# Patient Record
Sex: MALE | Race: WHITE | NOT HISPANIC OR LATINO | URBAN - METROPOLITAN AREA
[De-identification: names, ages, dates, MRNs, and addresses within clinical notes are randomized per-mention and may not be internally consistent; named-entity substitution may affect disease eponyms.]

---

## 2019-03-18 ENCOUNTER — EMERGENCY (EMERGENCY)
Facility: HOSPITAL | Age: 39
LOS: 1 days | Discharge: HOME | End: 2019-03-18
Attending: EMERGENCY MEDICINE | Admitting: EMERGENCY MEDICINE
Payer: SELF-PAY

## 2019-03-18 VITALS
WEIGHT: 240.97 LBS | HEART RATE: 99 BPM | SYSTOLIC BLOOD PRESSURE: 153 MMHG | DIASTOLIC BLOOD PRESSURE: 86 MMHG | OXYGEN SATURATION: 95 % | TEMPERATURE: 97 F | RESPIRATION RATE: 18 BRPM

## 2019-03-18 DIAGNOSIS — S61.210A LACERATION WITHOUT FOREIGN BODY OF RIGHT INDEX FINGER WITHOUT DAMAGE TO NAIL, INITIAL ENCOUNTER: ICD-10-CM

## 2019-03-18 PROCEDURE — 12041 INTMD RPR N-HF/GENIT 2.5CM/<: CPT

## 2019-03-18 PROCEDURE — 99283 EMERGENCY DEPT VISIT LOW MDM: CPT | Mod: 25

## 2019-03-18 RX ADMIN — Medication 1 TABLET(S): at 20:39

## 2019-03-18 NOTE — ED PROVIDER NOTE - CLINICAL SUMMARY MEDICAL DECISION MAKING FREE TEXT BOX
38 yo M pw right second finger lac- tdap utd (last one two yrs ago) will put on ppx abx as pt was cutting cold cuts

## 2019-03-18 NOTE — ED PROVIDER NOTE - NSFOLLOWUPINSTRUCTIONS_ED_ALL_ED_FT
Follow up with your PMD within 48-72 hours for wound check. Keep sutures covered and dry for 24 hours then clean with soap and water three times daily.  Apply bacitracin and cover. Follow up with hand Dr. Horta, call for an appointment: [phone].  Take Augmentin two times a day for seven days. Return to ED for suture removal 7-10 days. Any increased pain, redness, streaking (red lines), swelling, fever, chills return to ER.

## 2019-03-18 NOTE — ED ADULT NURSE NOTE - OBJECTIVE STATEMENT
Pt came in to ED c/o LAC on right index finger by a knife at work while washing the dishes. Able to move finger, +radial pulse, cap refill <3 sec, denies any other concerns at this time

## 2019-03-18 NOTE — ED PROVIDER NOTE - OBJECTIVE STATEMENT
39 year old male, no PMHx, presents to the ED complaining of right second finger laceration. Patient works at a [email or web address] and was washing dishes. He reached his hand into soapy water and didn't see there was a knife and accidentally cut his finger. He denies other injuries, numbness, tingling or other complaints.

## 2019-03-18 NOTE — ED PROVIDER NOTE - ATTENDING CONTRIBUTION TO CARE
pt cut R 2nd finger accidentally about an hr pta on knife.  denies numbnessweaknes. assoc c bleeding    exam: gen:nad R 2nd finger c approx 2cm linear lac on radial side of mid phalanx. no visible tendon/ligament lac. no active bleeding. nl distal sensation and nl cap refill. nl color.  5/5 strength flex/ext dip/pip/mcp.      AP: finger lac. nv intact. motor intact. lac repair. pt cut R 2nd finger accidentally about an hr pta on knife.  denies numbnessweaknes. assoc c bleeding    exam: gen:nad R 2nd finger c approx 2cm linear lac on radial side of mid phalanx. no visible tendon/ligament lac. no active bleeding. nl distal sensation and nl cap refill. nl color.  5/5 strength flex/ext dip/pip/mcp.      AP: finger lac. nv intact. motor intact. lac repair..

## 2019-07-12 ENCOUNTER — EMERGENCY (EMERGENCY)
Facility: HOSPITAL | Age: 39
LOS: 1 days | Discharge: HOME | End: 2019-07-12
Attending: EMERGENCY MEDICINE | Admitting: EMERGENCY MEDICINE
Payer: MEDICAID

## 2019-07-12 VITALS
HEART RATE: 105 BPM | OXYGEN SATURATION: 97 % | HEIGHT: 73 IN | DIASTOLIC BLOOD PRESSURE: 87 MMHG | WEIGHT: 235.01 LBS | TEMPERATURE: 99 F | SYSTOLIC BLOOD PRESSURE: 128 MMHG | RESPIRATION RATE: 17 BRPM

## 2019-07-12 VITALS
HEART RATE: 78 BPM | TEMPERATURE: 99 F | RESPIRATION RATE: 16 BRPM | OXYGEN SATURATION: 96 % | SYSTOLIC BLOOD PRESSURE: 119 MMHG | DIASTOLIC BLOOD PRESSURE: 72 MMHG

## 2019-07-12 LAB
ALBUMIN SERPL ELPH-MCNC: 2.3 G/DL — LOW (ref 3.3–5)
ALP SERPL-CCNC: 431 U/L — HIGH (ref 40–120)
ALT FLD-CCNC: 85 U/L DA — HIGH (ref 10–45)
ANION GAP SERPL CALC-SCNC: 11 MMOL/L — SIGNIFICANT CHANGE UP (ref 5–17)
AST SERPL-CCNC: 192 U/L — HIGH (ref 10–40)
BASOPHILS # BLD AUTO: 0.03 K/UL — SIGNIFICANT CHANGE UP (ref 0–0.2)
BASOPHILS NFR BLD AUTO: 0.4 % — SIGNIFICANT CHANGE UP (ref 0–2)
BILIRUB SERPL-MCNC: 5.2 MG/DL — HIGH (ref 0.2–1.2)
BUN SERPL-MCNC: 10 MG/DL — SIGNIFICANT CHANGE UP (ref 7–23)
CALCIUM SERPL-MCNC: 8.5 MG/DL — SIGNIFICANT CHANGE UP (ref 8.4–10.5)
CHLORIDE SERPL-SCNC: 102 MMOL/L — SIGNIFICANT CHANGE UP (ref 96–108)
CO2 SERPL-SCNC: 21 MMOL/L — LOW (ref 22–31)
CREAT SERPL-MCNC: 0.84 MG/DL — SIGNIFICANT CHANGE UP (ref 0.5–1.3)
EOSINOPHIL # BLD AUTO: 0.1 K/UL — SIGNIFICANT CHANGE UP (ref 0–0.5)
EOSINOPHIL NFR BLD AUTO: 1.4 % — SIGNIFICANT CHANGE UP (ref 0–6)
GLUCOSE SERPL-MCNC: 114 MG/DL — HIGH (ref 70–99)
HCT VFR BLD CALC: 29 % — LOW (ref 39–50)
HGB BLD-MCNC: 9.9 G/DL — LOW (ref 13–17)
IMM GRANULOCYTES NFR BLD AUTO: 0.6 % — SIGNIFICANT CHANGE UP (ref 0–1.5)
LIDOCAIN IGE QN: 175 U/L — SIGNIFICANT CHANGE UP (ref 73–393)
LYMPHOCYTES # BLD AUTO: 1.28 K/UL — SIGNIFICANT CHANGE UP (ref 1–3.3)
LYMPHOCYTES # BLD AUTO: 17.8 % — SIGNIFICANT CHANGE UP (ref 13–44)
MCHC RBC-ENTMCNC: 34.1 GM/DL — SIGNIFICANT CHANGE UP (ref 32–36)
MCHC RBC-ENTMCNC: 35 PG — HIGH (ref 27–34)
MCV RBC AUTO: 102.5 FL — HIGH (ref 80–100)
MONOCYTES # BLD AUTO: 0.51 K/UL — SIGNIFICANT CHANGE UP (ref 0–0.9)
MONOCYTES NFR BLD AUTO: 7.1 % — SIGNIFICANT CHANGE UP (ref 2–14)
NEUTROPHILS # BLD AUTO: 5.22 K/UL — SIGNIFICANT CHANGE UP (ref 1.8–7.4)
NEUTROPHILS NFR BLD AUTO: 72.7 % — SIGNIFICANT CHANGE UP (ref 43–77)
NRBC # BLD: 0 /100 WBCS — SIGNIFICANT CHANGE UP (ref 0–0)
PLATELET # BLD AUTO: 135 K/UL — LOW (ref 150–400)
POTASSIUM SERPL-MCNC: 3.6 MMOL/L — SIGNIFICANT CHANGE UP (ref 3.5–5.3)
POTASSIUM SERPL-SCNC: 3.6 MMOL/L — SIGNIFICANT CHANGE UP (ref 3.5–5.3)
PROT SERPL-MCNC: 6.3 G/DL — SIGNIFICANT CHANGE UP (ref 6–8.3)
RBC # BLD: 2.83 M/UL — LOW (ref 4.2–5.8)
RBC # FLD: 16.2 % — HIGH (ref 10.3–14.5)
SODIUM SERPL-SCNC: 134 MMOL/L — LOW (ref 135–145)
WBC # BLD: 7.18 K/UL — SIGNIFICANT CHANGE UP (ref 3.8–10.5)
WBC # FLD AUTO: 7.18 K/UL — SIGNIFICANT CHANGE UP (ref 3.8–10.5)

## 2019-07-12 PROCEDURE — 80074 ACUTE HEPATITIS PANEL: CPT

## 2019-07-12 PROCEDURE — 96375 TX/PRO/DX INJ NEW DRUG ADDON: CPT | Mod: XU

## 2019-07-12 PROCEDURE — 99284 EMERGENCY DEPT VISIT MOD MDM: CPT | Mod: 25

## 2019-07-12 PROCEDURE — 96361 HYDRATE IV INFUSION ADD-ON: CPT

## 2019-07-12 PROCEDURE — 36415 COLL VENOUS BLD VENIPUNCTURE: CPT

## 2019-07-12 PROCEDURE — 74177 CT ABD & PELVIS W/CONTRAST: CPT

## 2019-07-12 PROCEDURE — 99284 EMERGENCY DEPT VISIT MOD MDM: CPT

## 2019-07-12 PROCEDURE — 85027 COMPLETE CBC AUTOMATED: CPT

## 2019-07-12 PROCEDURE — 74177 CT ABD & PELVIS W/CONTRAST: CPT | Mod: 26

## 2019-07-12 PROCEDURE — 80053 COMPREHEN METABOLIC PANEL: CPT

## 2019-07-12 PROCEDURE — 96365 THER/PROPH/DIAG IV INF INIT: CPT | Mod: XU

## 2019-07-12 PROCEDURE — 83690 ASSAY OF LIPASE: CPT

## 2019-07-12 RX ORDER — FAMOTIDINE 10 MG/ML
20 INJECTION INTRAVENOUS ONCE
Refills: 0 | Status: COMPLETED | OUTPATIENT
Start: 2019-07-12 | End: 2019-07-12

## 2019-07-12 RX ORDER — DIPHENHYDRAMINE HCL 50 MG
25 CAPSULE ORAL ONCE
Refills: 0 | Status: COMPLETED | OUTPATIENT
Start: 2019-07-12 | End: 2019-07-12

## 2019-07-12 RX ORDER — SODIUM CHLORIDE 9 MG/ML
1000 INJECTION INTRAMUSCULAR; INTRAVENOUS; SUBCUTANEOUS ONCE
Refills: 0 | Status: COMPLETED | OUTPATIENT
Start: 2019-07-12 | End: 2019-07-12

## 2019-07-12 RX ORDER — CHOLESTYRAMINE 4 G/9G
4 POWDER, FOR SUSPENSION ORAL
Qty: 30 | Refills: 0
Start: 2019-07-12 | End: 2019-08-10

## 2019-07-12 RX ADMIN — SODIUM CHLORIDE 1000 MILLILITER(S): 9 INJECTION INTRAMUSCULAR; INTRAVENOUS; SUBCUTANEOUS at 18:47

## 2019-07-12 RX ADMIN — SODIUM CHLORIDE 1000 MILLILITER(S): 9 INJECTION INTRAMUSCULAR; INTRAVENOUS; SUBCUTANEOUS at 18:10

## 2019-07-12 RX ADMIN — FAMOTIDINE 20 MILLIGRAM(S): 10 INJECTION INTRAVENOUS at 18:15

## 2019-07-12 RX ADMIN — Medication 25 MILLIGRAM(S): at 17:55

## 2019-07-12 RX ADMIN — FAMOTIDINE 100 MILLIGRAM(S): 10 INJECTION INTRAVENOUS at 17:55

## 2019-07-12 RX ADMIN — Medication 125 MILLIGRAM(S): at 18:10

## 2019-07-12 NOTE — ED PROVIDER NOTE - CLINICAL SUMMARY MEDICAL DECISION MAKING FREE TEXT BOX
39 yr old male with no known pmhx presents c/o diffuse itchy rash on arms, abdomen and back for the last 2 weeks. Pt reports he thought it was heat rash, since rash is in areas that  " I sweat". No improvement. Co- worker noted yellow eyes a few days ago. Pt reports to drinking, everyday? Denies any other drugs. No new medications, soaps or detergents. Denies any chest pain, sob, fever, chills, shortness of breath or any other symptoms. 39 yr old male with no known pmhx presents c/o diffuse itchy rash on arms, abdomen and back for the last 2 weeks. Pt reports he thought it was heat rash, since rash is in areas that  " I sweat". No improvement. No new medications, soaps or detergents. Denies any chest pain, sob, fever, chills, shortness of breath or any other symptoms.  On exam, pt noted to have scleral icterus.  When asked if he noticed, he states his co- worker noted yellow eyes a few days ago. Pt reports to drinking, everyday? Denies any other drugs. No abd pain or vomiting.   Plan for meds for symptomatic relief but will check labs for LFTs and Acute hepatitis panel. S/O to Dr Sears and PLACIDO Merritt to f/u studies. Pt has no PMD. If no pressing findings on imaging, pt may be discharged if we can arrange proper follow up.

## 2019-07-12 NOTE — ED PROVIDER NOTE - PROGRESS NOTE DETAILS
PLACIDO Fuller - pt signed out to me at the shift change; elevated liver enzymes; jaundice, Dr Sears spoke with GI doctor Dr Doshi - pt to be started on cholestyramine 4 g daily, pt will f/u with Dr Doshi within 1 week.

## 2019-07-12 NOTE — ED PROVIDER NOTE - CARE PROVIDER_API CALL
Jean-Paul Bridges (MD)  Gastroenterology; Internal Medicine  [address] [place] [address]  Phone: [phone]  Fax: [phone]  Follow Up Time:

## 2019-07-12 NOTE — ED ADULT NURSE NOTE - OBJECTIVE STATEMENT
40 y/o male came in c/o rash x 1 week. pt states rash started on his hands and arms then began to spread to the back and stomach. pt denies any new foods or new products. pt states the rash is itchy no oozing or pus. no fevers no chills. no hives no inflammation.  pt believes the rash is from the heat because he travels outside often. patches red and scaly. no open wounds. vs stable continue to monitor. 38 y/o male came in c/o rash x 1 week. pt states rash started on his hands and arms then began to spread to the back and stomach. pt denies any new foods or new products. pt states the rash is itchy no oozing or pus. no fevers no chills. no hives no inflammation.  pt believes the rash is from the heat because he travels outside often. patches red and scaly. no open wounds. pt does appear jaundice and states he drinks everyday. s stable continue to monitor.

## 2019-07-12 NOTE — ED PROVIDER NOTE - OBJECTIVE STATEMENT
39 yr old male with no known pmhx presents c/o diffuse itchy rash on arms, abdomen and back for the last 2 weeks. Pt reports he thought it was heat rash, since rash is in areas that  " I sweat". No improvement. Co- worker noted yellow eyes a few days ago. Pt reports to drinking, everyday? Denies any other drugs. No new medications, soaps or detergents. Denies any chest pain, sob, fever, chills, shortness of breath or any other symptoms. 39 yr old male with no known pmhx presents c/o diffuse itchy rash on arms, abdomen and back for the last 2 weeks. Pt reports he thought it was heat rash, since rash is in areas that  " I sweat". No improvement. No new medications, soaps or detergents. Denies any chest pain, sob, fever, chills, shortness of breath or any other symptoms.

## 2019-07-12 NOTE — ED PROVIDER NOTE - ATTENDING CONTRIBUTION TO CARE
Eliazar with PLACIDO Pineda. 39 yr old male with no known pmhx presents c/o diffuse itchy rash on arms, abdomen and back for the last 2 weeks. Pt reports he thought it was heat rash, since rash is in areas that  " I sweat". No improvement. No new medications, soaps or detergents. Denies any chest pain, sob, fever, chills, shortness of breath or any other symptoms.  On exam, pt noted to have scleral icterus.  When asked if he noticed, he states his co- worker noted yellow eyes a few days ago. Pt reports to drinking, everyday? Denies any other drugs. No abd pain or vomiting.   Plan for meds for symptomatic relief but will check labs for LFTs and Acute hepatitis panel. S/O to Dr Sears and PLACIDO Merritt to f/u studies. Pt has no PMD. If no pressing findings on imaging, pt may be discharged if we can arrange proper follow up.   I performed a face to face bedside interview with patient regarding history of present illness, review of symptoms and past medical history. I completed an independent physical exam.  I have discussed the patient's plan of care with Physician Assistant (PA). I agree with note as stated above, having amended the EMR as needed to reflect my findings.   This includes History of Present Illness, HIV, Past Medical/Surgical/Family/Social History, Allergies and Home Medications, Review of Systems, Physical Exam, and any Progress Notes during the time I functioned as the attending physician for this patient.

## 2019-07-12 NOTE — ED PROVIDER NOTE - NSFOLLOWUPINSTRUCTIONS_ED_ALL_ED_FT
PLEASE MAKE SURE THAT YOU TAKE MEDICATIONS AS PRESCRIBED, AVOID TYLENOL AS WELL AS ALCOHOL. FOLLOW WITH DR MORSE ([facility]) WITHIN 1 WEEK. RETURN TO ER FOR WORSENING SYMPTOMS;

## 2019-07-13 PROBLEM — Z78.9 OTHER SPECIFIED HEALTH STATUS: Chronic | Status: ACTIVE | Noted: 2019-03-18

## 2019-07-13 LAB
HAV IGM SER-ACNC: SIGNIFICANT CHANGE UP
HBV CORE IGM SER-ACNC: SIGNIFICANT CHANGE UP
HBV SURFACE AG SER-ACNC: SIGNIFICANT CHANGE UP
HCV AB S/CO SERPL IA: 0.2 S/CO — SIGNIFICANT CHANGE UP (ref 0–0.99)
HCV AB SERPL-IMP: SIGNIFICANT CHANGE UP

## 2019-07-13 RX ORDER — CHOLESTYRAMINE 4 G/9G
4 POWDER, FOR SUSPENSION ORAL
Qty: 14 | Refills: 0
Start: 2019-07-13 | End: 2019-07-26

## 2021-01-01 ENCOUNTER — EMERGENCY (EMERGENCY)
Facility: HOSPITAL | Age: 41
LOS: 1 days | Discharge: HOME | End: 2021-01-01
Attending: EMERGENCY MEDICINE | Admitting: EMERGENCY MEDICINE
Payer: COMMERCIAL

## 2021-01-01 ENCOUNTER — APPOINTMENT (OUTPATIENT)
Dept: FAMILY MEDICINE | Facility: CLINIC | Age: 41
End: 2021-01-01

## 2021-01-01 ENCOUNTER — NON-APPOINTMENT (OUTPATIENT)
Age: 41
End: 2021-01-01

## 2021-01-01 ENCOUNTER — INPATIENT (INPATIENT)
Facility: HOSPITAL | Age: 41
LOS: 1 days | DRG: 296 | End: 2021-11-06
Attending: INTERNAL MEDICINE | Admitting: INTERNAL MEDICINE
Payer: COMMERCIAL

## 2021-01-01 VITALS
HEIGHT: 73 IN | WEIGHT: 229.94 LBS | SYSTOLIC BLOOD PRESSURE: 91 MMHG | TEMPERATURE: 98 F | DIASTOLIC BLOOD PRESSURE: 56 MMHG | HEART RATE: 89 BPM | RESPIRATION RATE: 18 BRPM | OXYGEN SATURATION: 97 %

## 2021-01-01 VITALS
HEART RATE: 94 BPM | RESPIRATION RATE: 18 BRPM | DIASTOLIC BLOOD PRESSURE: 85 MMHG | OXYGEN SATURATION: 97 % | TEMPERATURE: 98 F | SYSTOLIC BLOOD PRESSURE: 148 MMHG

## 2021-01-01 VITALS — OXYGEN SATURATION: 80 % | RESPIRATION RATE: 24 BRPM

## 2021-01-01 VITALS — WEIGHT: 271.17 LBS | HEIGHT: 73 IN | RESPIRATION RATE: 19 BRPM | HEART RATE: 54 BPM | OXYGEN SATURATION: 93 %

## 2021-01-01 DIAGNOSIS — G93.6 CEREBRAL EDEMA: ICD-10-CM

## 2021-01-01 DIAGNOSIS — Z66 DO NOT RESUSCITATE: ICD-10-CM

## 2021-01-01 DIAGNOSIS — G93.1 ANOXIC BRAIN DAMAGE, NOT ELSEWHERE CLASSIFIED: ICD-10-CM

## 2021-01-01 DIAGNOSIS — I46.9 CARDIAC ARREST, CAUSE UNSPECIFIED: ICD-10-CM

## 2021-01-01 DIAGNOSIS — N17.9 ACUTE KIDNEY FAILURE, UNSPECIFIED: ICD-10-CM

## 2021-01-01 DIAGNOSIS — T68.XXXA HYPOTHERMIA, INITIAL ENCOUNTER: ICD-10-CM

## 2021-01-01 DIAGNOSIS — E87.4 MIXED DISORDER OF ACID-BASE BALANCE: ICD-10-CM

## 2021-01-01 DIAGNOSIS — E87.2 ACIDOSIS: ICD-10-CM

## 2021-01-01 DIAGNOSIS — R73.9 HYPERGLYCEMIA, UNSPECIFIED: ICD-10-CM

## 2021-01-01 DIAGNOSIS — J96.02 ACUTE RESPIRATORY FAILURE WITH HYPERCAPNIA: ICD-10-CM

## 2021-01-01 DIAGNOSIS — E87.6 HYPOKALEMIA: ICD-10-CM
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
## 2021-01-01 DIAGNOSIS — D69.6 THROMBOCYTOPENIA, UNSPECIFIED: ICD-10-CM

## 2021-01-01 DIAGNOSIS — J96.01 ACUTE RESPIRATORY FAILURE WITH HYPOXIA: ICD-10-CM
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
## 2021-01-01 DIAGNOSIS — R56.9 UNSPECIFIED CONVULSIONS: ICD-10-CM

## 2021-01-01 DIAGNOSIS — D72.829 ELEVATED WHITE BLOOD CELL COUNT, UNSPECIFIED: ICD-10-CM

## 2021-01-01 LAB
A1C WITH ESTIMATED AVERAGE GLUCOSE RESULT: 4.8 % — SIGNIFICANT CHANGE UP (ref 4–5.6)
ALBUMIN SERPL ELPH-MCNC: 2.8 G/DL — LOW (ref 3.3–5)
ALBUMIN SERPL ELPH-MCNC: 2.9 G/DL — LOW (ref 3.3–5)
ALBUMIN SERPL ELPH-MCNC: 3.1 G/DL — LOW (ref 3.3–5)
ALBUMIN SERPL ELPH-MCNC: 3.4 G/DL — SIGNIFICANT CHANGE UP (ref 3.3–5)
ALP SERPL-CCNC: 104 U/L — SIGNIFICANT CHANGE UP (ref 40–120)
ALP SERPL-CCNC: 105 U/L — SIGNIFICANT CHANGE UP (ref 40–120)
ALP SERPL-CCNC: 112 U/L — SIGNIFICANT CHANGE UP (ref 40–120)
ALP SERPL-CCNC: 116 U/L — SIGNIFICANT CHANGE UP (ref 40–120)
ALT FLD-CCNC: 133 U/L — HIGH (ref 10–45)
ALT FLD-CCNC: 134 U/L — HIGH (ref 10–45)
ALT FLD-CCNC: 43 U/L — SIGNIFICANT CHANGE UP (ref 10–45)
ALT FLD-CCNC: 68 U/L — HIGH (ref 10–45)
AMPHET UR-MCNC: NEGATIVE — SIGNIFICANT CHANGE UP
ANION GAP SERPL CALC-SCNC: 11 MMOL/L — SIGNIFICANT CHANGE UP (ref 5–17)
ANION GAP SERPL CALC-SCNC: 13 MMOL/L — SIGNIFICANT CHANGE UP (ref 5–17)
ANION GAP SERPL CALC-SCNC: 16 MMOL/L — SIGNIFICANT CHANGE UP (ref 5–17)
ANION GAP SERPL CALC-SCNC: 31 MMOL/L — HIGH (ref 5–17)
ANION GAP SERPL CALC-SCNC: 7 MMOL/L — SIGNIFICANT CHANGE UP (ref 5–17)
ANION GAP SERPL CALC-SCNC: 7 MMOL/L — SIGNIFICANT CHANGE UP (ref 5–17)
ANION GAP SERPL CALC-SCNC: 8 MMOL/L — SIGNIFICANT CHANGE UP (ref 5–17)
ANION GAP SERPL CALC-SCNC: 9 MMOL/L — SIGNIFICANT CHANGE UP (ref 5–17)
APPEARANCE UR: ABNORMAL
APTT BLD: 32.5 SEC — SIGNIFICANT CHANGE UP (ref 27.5–35.5)
APTT BLD: 33.2 SEC — SIGNIFICANT CHANGE UP (ref 27.5–35.5)
APTT BLD: 36.2 SEC — HIGH (ref 27.5–35.5)
APTT BLD: 37.8 SEC — HIGH (ref 27.5–35.5)
APTT BLD: 38.1 SEC — HIGH (ref 27.5–35.5)
APTT BLD: 40.4 SEC — HIGH (ref 27.5–35.5)
APTT BLD: 40.7 SEC — HIGH (ref 27.5–35.5)
APTT BLD: 60.4 SEC — HIGH (ref 27.5–35.5)
AST SERPL-CCNC: 107 U/L — HIGH (ref 10–40)
AST SERPL-CCNC: 228 U/L — HIGH (ref 10–40)
AST SERPL-CCNC: 386 U/L — HIGH (ref 10–40)
AST SERPL-CCNC: 393 U/L — HIGH (ref 10–40)
BACTERIA # UR AUTO: ABNORMAL /HPF
BARBITURATES UR SCN-MCNC: NEGATIVE — SIGNIFICANT CHANGE UP
BASE EXCESS BLDA CALC-SCNC: 0.9 MMOL/L — SIGNIFICANT CHANGE UP (ref -2–3)
BASE EXCESS BLDA CALC-SCNC: 1.2 MMOL/L — SIGNIFICANT CHANGE UP (ref -2–3)
BASE EXCESS BLDA CALC-SCNC: 1.4 MMOL/L — SIGNIFICANT CHANGE UP (ref -2–3)
BASE EXCESS BLDA CALC-SCNC: 1.7 MMOL/L — SIGNIFICANT CHANGE UP (ref -2–3)
BASE EXCESS BLDA CALC-SCNC: 3 MMOL/L — SIGNIFICANT CHANGE UP (ref -2–3)
BASE EXCESS BLDA CALC-SCNC: 4.7 MMOL/L — HIGH (ref -2–3)
BASOPHILS # BLD AUTO: 0 K/UL — SIGNIFICANT CHANGE UP (ref 0–0.2)
BASOPHILS # BLD AUTO: 0 K/UL — SIGNIFICANT CHANGE UP (ref 0–0.2)
BASOPHILS # BLD AUTO: 0.05 K/UL — SIGNIFICANT CHANGE UP (ref 0–0.2)
BASOPHILS # BLD AUTO: 0.07 K/UL — SIGNIFICANT CHANGE UP (ref 0–0.2)
BASOPHILS # BLD AUTO: 0.14 K/UL — SIGNIFICANT CHANGE UP (ref 0–0.2)
BASOPHILS # BLD AUTO: 0.15 K/UL — SIGNIFICANT CHANGE UP (ref 0–0.2)
BASOPHILS NFR BLD AUTO: 0 % — SIGNIFICANT CHANGE UP (ref 0–2)
BASOPHILS NFR BLD AUTO: 0 % — SIGNIFICANT CHANGE UP (ref 0–2)
BASOPHILS NFR BLD AUTO: 0.4 % — SIGNIFICANT CHANGE UP (ref 0–2)
BASOPHILS NFR BLD AUTO: 0.8 % — SIGNIFICANT CHANGE UP (ref 0–2)
BASOPHILS NFR BLD AUTO: 0.8 % — SIGNIFICANT CHANGE UP (ref 0–2)
BASOPHILS NFR BLD AUTO: 0.9 % — SIGNIFICANT CHANGE UP (ref 0–2)
BENZODIAZ UR-MCNC: NEGATIVE — SIGNIFICANT CHANGE UP
BILIRUB SERPL-MCNC: 1.2 MG/DL — SIGNIFICANT CHANGE UP (ref 0.2–1.2)
BILIRUB SERPL-MCNC: 1.6 MG/DL — HIGH (ref 0.2–1.2)
BILIRUB SERPL-MCNC: 2.6 MG/DL — HIGH (ref 0.2–1.2)
BILIRUB SERPL-MCNC: 2.7 MG/DL — HIGH (ref 0.2–1.2)
BILIRUB UR-MCNC: NEGATIVE — SIGNIFICANT CHANGE UP
BLASTS # FLD: 2 % — HIGH (ref 0–0)
BLD GP AB SCN SERPL QL: SIGNIFICANT CHANGE UP
BLOOD GAS COMMENTS ARTERIAL: SIGNIFICANT CHANGE UP
BLOOD GAS COMMENTS ARTERIAL: SIGNIFICANT CHANGE UP
BUN SERPL-MCNC: 10 MG/DL — SIGNIFICANT CHANGE UP (ref 7–23)
BUN SERPL-MCNC: 18 MG/DL — SIGNIFICANT CHANGE UP (ref 7–23)
BUN SERPL-MCNC: 23 MG/DL — SIGNIFICANT CHANGE UP (ref 7–23)
BUN SERPL-MCNC: 25 MG/DL — HIGH (ref 7–23)
BUN SERPL-MCNC: 25 MG/DL — HIGH (ref 7–23)
BUN SERPL-MCNC: 27 MG/DL — HIGH (ref 7–23)
BUN SERPL-MCNC: 27 MG/DL — HIGH (ref 7–23)
BUN SERPL-MCNC: 28 MG/DL — HIGH (ref 7–23)
CA-I BLD-SCNC: 1.09 MMOL/L — LOW (ref 1.15–1.33)
CALCIUM SERPL-MCNC: 10.5 MG/DL — SIGNIFICANT CHANGE UP (ref 8.4–10.5)
CALCIUM SERPL-MCNC: 8.2 MG/DL — LOW (ref 8.4–10.5)
CALCIUM SERPL-MCNC: 8.2 MG/DL — LOW (ref 8.4–10.5)
CALCIUM SERPL-MCNC: 8.3 MG/DL — LOW (ref 8.4–10.5)
CALCIUM SERPL-MCNC: 8.3 MG/DL — LOW (ref 8.4–10.5)
CALCIUM SERPL-MCNC: 8.4 MG/DL — SIGNIFICANT CHANGE UP (ref 8.4–10.5)
CALCIUM SERPL-MCNC: 8.4 MG/DL — SIGNIFICANT CHANGE UP (ref 8.4–10.5)
CALCIUM SERPL-MCNC: 8.7 MG/DL — SIGNIFICANT CHANGE UP (ref 8.4–10.5)
CHLORIDE SERPL-SCNC: 100 MMOL/L — SIGNIFICANT CHANGE UP (ref 96–108)
CHLORIDE SERPL-SCNC: 102 MMOL/L — SIGNIFICANT CHANGE UP (ref 96–108)
CHLORIDE SERPL-SCNC: 104 MMOL/L — SIGNIFICANT CHANGE UP (ref 96–108)
CHLORIDE SERPL-SCNC: 106 MMOL/L — SIGNIFICANT CHANGE UP (ref 96–108)
CHLORIDE SERPL-SCNC: 106 MMOL/L — SIGNIFICANT CHANGE UP (ref 96–108)
CHLORIDE SERPL-SCNC: 107 MMOL/L — SIGNIFICANT CHANGE UP (ref 96–108)
CHLORIDE SERPL-SCNC: 107 MMOL/L — SIGNIFICANT CHANGE UP (ref 96–108)
CHLORIDE SERPL-SCNC: 98 MMOL/L — SIGNIFICANT CHANGE UP (ref 96–108)
CK MB BLD-MCNC: 0.1 % — SIGNIFICANT CHANGE UP (ref 0–3.5)
CK MB CFR SERPL CALC: 10.1 NG/ML — HIGH (ref 0.5–10)
CK SERPL-CCNC: 3857 U/L — HIGH (ref 30–200)
CK SERPL-CCNC: 4722 U/L — HIGH (ref 30–200)
CK SERPL-CCNC: 8801 U/L — HIGH (ref 30–200)
CO2 BLDA-SCNC: 25 MMOL/L — HIGH (ref 19–24)
CO2 BLDA-SCNC: 27 MMOL/L — HIGH (ref 19–24)
CO2 BLDA-SCNC: 27 MMOL/L — HIGH (ref 19–24)
CO2 BLDA-SCNC: 29 MMOL/L — HIGH (ref 19–24)
CO2 BLDA-SCNC: 30 MMOL/L — HIGH (ref 19–24)
CO2 BLDA-SCNC: 30 MMOL/L — HIGH (ref 19–24)
CO2 BLDA-SCNC: 31 MMOL/L — HIGH (ref 19–24)
CO2 SERPL-SCNC: 11 MMOL/L — LOW (ref 22–31)
CO2 SERPL-SCNC: 23 MMOL/L — SIGNIFICANT CHANGE UP (ref 22–31)
CO2 SERPL-SCNC: 24 MMOL/L — SIGNIFICANT CHANGE UP (ref 22–31)
CO2 SERPL-SCNC: 26 MMOL/L — SIGNIFICANT CHANGE UP (ref 22–31)
CO2 SERPL-SCNC: 29 MMOL/L — SIGNIFICANT CHANGE UP (ref 22–31)
CO2 SERPL-SCNC: 30 MMOL/L — SIGNIFICANT CHANGE UP (ref 22–31)
CO2 SERPL-SCNC: 30 MMOL/L — SIGNIFICANT CHANGE UP (ref 22–31)
CO2 SERPL-SCNC: 32 MMOL/L — HIGH (ref 22–31)
COCAINE METAB.OTHER UR-MCNC: NEGATIVE — SIGNIFICANT CHANGE UP
COLOR SPEC: YELLOW — SIGNIFICANT CHANGE UP
COMMENT - URINE: SIGNIFICANT CHANGE UP
COVID-19 NUCLEOCAPSID GAM AB INTERP: POSITIVE
COVID-19 NUCLEOCAPSID TOTAL GAM ANTIBODY RESULT: 1.99 INDEX — HIGH
COVID-19 SPIKE DOMAIN AB INTERP: POSITIVE
COVID-19 SPIKE DOMAIN ANTIBODY RESULT: >250 U/ML — HIGH
CREAT SERPL-MCNC: 1.12 MG/DL — SIGNIFICANT CHANGE UP (ref 0.5–1.3)
CREAT SERPL-MCNC: 1.84 MG/DL — HIGH (ref 0.5–1.3)
CREAT SERPL-MCNC: 1.98 MG/DL — HIGH (ref 0.5–1.3)
CREAT SERPL-MCNC: 1.99 MG/DL — HIGH (ref 0.5–1.3)
CREAT SERPL-MCNC: 2 MG/DL — HIGH (ref 0.5–1.3)
CREAT SERPL-MCNC: 2.05 MG/DL — HIGH (ref 0.5–1.3)
CREAT SERPL-MCNC: 2.31 MG/DL — HIGH (ref 0.5–1.3)
CREAT SERPL-MCNC: 2.38 MG/DL — HIGH (ref 0.5–1.3)
CULTURE RESULTS: SIGNIFICANT CHANGE UP
CULTURE RESULTS: SIGNIFICANT CHANGE UP
DACRYOCYTES BLD QL SMEAR: SLIGHT — SIGNIFICANT CHANGE UP
DIFF PNL FLD: ABNORMAL
EOSINOPHIL # BLD AUTO: 0 K/UL — SIGNIFICANT CHANGE UP (ref 0–0.5)
EOSINOPHIL # BLD AUTO: 0.12 K/UL — SIGNIFICANT CHANGE UP (ref 0–0.5)
EOSINOPHIL # BLD AUTO: 0.13 K/UL — SIGNIFICANT CHANGE UP (ref 0–0.5)
EOSINOPHIL # BLD AUTO: 0.15 K/UL — SIGNIFICANT CHANGE UP (ref 0–0.5)
EOSINOPHIL # BLD AUTO: 0.21 K/UL — SIGNIFICANT CHANGE UP (ref 0–0.5)
EOSINOPHIL # BLD AUTO: 0.22 K/UL — SIGNIFICANT CHANGE UP (ref 0–0.5)
EOSINOPHIL NFR BLD AUTO: 0 % — SIGNIFICANT CHANGE UP (ref 0–6)
EOSINOPHIL NFR BLD AUTO: 0.9 % — SIGNIFICANT CHANGE UP (ref 0–6)
EOSINOPHIL NFR BLD AUTO: 1 % — SIGNIFICANT CHANGE UP (ref 0–6)
EOSINOPHIL NFR BLD AUTO: 1.1 % — SIGNIFICANT CHANGE UP (ref 0–6)
EOSINOPHIL NFR BLD AUTO: 1.4 % — SIGNIFICANT CHANGE UP (ref 0–6)
EOSINOPHIL NFR BLD AUTO: 1.8 % — SIGNIFICANT CHANGE UP (ref 0–6)
EPI CELLS # UR: SIGNIFICANT CHANGE UP
ESTIMATED AVERAGE GLUCOSE: 91 MG/DL — SIGNIFICANT CHANGE UP (ref 68–114)
ETHANOL SERPL-MCNC: 326 MG/DL — HIGH (ref 0–3)
ETHANOL SERPL-MCNC: 4 MG/DL — HIGH (ref 0–3)
FIBRINOGEN PPP-MCNC: 371 MG/DL — SIGNIFICANT CHANGE UP (ref 290–520)
FIBRINOGEN PPP-MCNC: 425 MG/DL — SIGNIFICANT CHANGE UP (ref 290–520)
FIBRINOGEN PPP-MCNC: 474 MG/DL — SIGNIFICANT CHANGE UP (ref 290–520)
FIBRINOGEN PPP-MCNC: 500 MG/DL — SIGNIFICANT CHANGE UP (ref 290–520)
FIBRINOGEN PPP-MCNC: 563 MG/DL — HIGH (ref 290–520)
FIBRINOGEN PPP-MCNC: 628 MG/DL — HIGH (ref 290–520)
GAS PNL BLDA: SIGNIFICANT CHANGE UP
GLUCOSE BLDC GLUCOMTR-MCNC: 106 MG/DL — HIGH (ref 70–99)
GLUCOSE BLDC GLUCOMTR-MCNC: 111 MG/DL — HIGH (ref 70–99)
GLUCOSE BLDC GLUCOMTR-MCNC: 117 MG/DL — HIGH (ref 70–99)
GLUCOSE BLDC GLUCOMTR-MCNC: 118 MG/DL — HIGH (ref 70–99)
GLUCOSE BLDC GLUCOMTR-MCNC: 133 MG/DL — HIGH (ref 70–99)
GLUCOSE BLDC GLUCOMTR-MCNC: 133 MG/DL — HIGH (ref 70–99)
GLUCOSE BLDC GLUCOMTR-MCNC: 134 MG/DL — HIGH (ref 70–99)
GLUCOSE BLDC GLUCOMTR-MCNC: 138 MG/DL — HIGH (ref 70–99)
GLUCOSE BLDC GLUCOMTR-MCNC: 180 MG/DL — HIGH (ref 70–99)
GLUCOSE BLDC GLUCOMTR-MCNC: 187 MG/DL — HIGH (ref 70–99)
GLUCOSE BLDC GLUCOMTR-MCNC: 202 MG/DL — HIGH (ref 70–99)
GLUCOSE BLDC GLUCOMTR-MCNC: 212 MG/DL — HIGH (ref 70–99)
GLUCOSE BLDC GLUCOMTR-MCNC: 214 MG/DL — HIGH (ref 70–99)
GLUCOSE BLDC GLUCOMTR-MCNC: 220 MG/DL — HIGH (ref 70–99)
GLUCOSE BLDC GLUCOMTR-MCNC: 228 MG/DL — HIGH (ref 70–99)
GLUCOSE BLDC GLUCOMTR-MCNC: 229 MG/DL — HIGH (ref 70–99)
GLUCOSE BLDC GLUCOMTR-MCNC: 233 MG/DL — HIGH (ref 70–99)
GLUCOSE BLDC GLUCOMTR-MCNC: 88 MG/DL — SIGNIFICANT CHANGE UP (ref 70–99)
GLUCOSE SERPL-MCNC: 116 MG/DL — HIGH (ref 70–99)
GLUCOSE SERPL-MCNC: 128 MG/DL — HIGH (ref 70–99)
GLUCOSE SERPL-MCNC: 128 MG/DL — HIGH (ref 70–99)
GLUCOSE SERPL-MCNC: 141 MG/DL — HIGH (ref 70–99)
GLUCOSE SERPL-MCNC: 156 MG/DL — HIGH (ref 70–99)
GLUCOSE SERPL-MCNC: 188 MG/DL — HIGH (ref 70–99)
GLUCOSE SERPL-MCNC: 229 MG/DL — HIGH (ref 70–99)
GLUCOSE SERPL-MCNC: 234 MG/DL — HIGH (ref 70–99)
GLUCOSE UR QL: 100 MG/DL
GRAM STN FLD: SIGNIFICANT CHANGE UP
GRAM STN FLD: SIGNIFICANT CHANGE UP
HAV IGM SER-ACNC: SIGNIFICANT CHANGE UP
HBV CORE IGM SER-ACNC: SIGNIFICANT CHANGE UP
HBV SURFACE AG SER-ACNC: SIGNIFICANT CHANGE UP
HCO3 BLDA-SCNC: 26 MMOL/L — SIGNIFICANT CHANGE UP (ref 21–28)
HCO3 BLDA-SCNC: 26 MMOL/L — SIGNIFICANT CHANGE UP (ref 21–28)
HCO3 BLDA-SCNC: 27 MMOL/L — SIGNIFICANT CHANGE UP (ref 21–28)
HCO3 BLDA-SCNC: 28 MMOL/L — SIGNIFICANT CHANGE UP (ref 21–28)
HCO3 BLDA-SCNC: 28 MMOL/L — SIGNIFICANT CHANGE UP (ref 21–28)
HCO3 BLDA-SCNC: 29 MMOL/L — HIGH (ref 21–28)
HCT VFR BLD CALC: 36 % — LOW (ref 39–50)
HCT VFR BLD CALC: 37.2 % — LOW (ref 39–50)
HCT VFR BLD CALC: 37.7 % — LOW (ref 39–50)
HCT VFR BLD CALC: 38.2 % — LOW (ref 39–50)
HCT VFR BLD CALC: 38.4 % — LOW (ref 39–50)
HCT VFR BLD CALC: 39.4 % — SIGNIFICANT CHANGE UP (ref 39–50)
HCT VFR BLD CALC: 39.5 % — SIGNIFICANT CHANGE UP (ref 39–50)
HCT VFR BLD CALC: 41 % — SIGNIFICANT CHANGE UP (ref 39–50)
HCV AB S/CO SERPL IA: 0.24 S/CO — SIGNIFICANT CHANGE UP (ref 0–0.99)
HCV AB SERPL-IMP: SIGNIFICANT CHANGE UP
HGB BLD-MCNC: 11.9 G/DL — LOW (ref 13–17)
HGB BLD-MCNC: 12.4 G/DL — LOW (ref 13–17)
HGB BLD-MCNC: 12.6 G/DL — LOW (ref 13–17)
HGB BLD-MCNC: 12.9 G/DL — LOW (ref 13–17)
HGB BLD-MCNC: 12.9 G/DL — LOW (ref 13–17)
HGB BLD-MCNC: 13.1 G/DL — SIGNIFICANT CHANGE UP (ref 13–17)
HGB BLD-MCNC: 13.2 G/DL — SIGNIFICANT CHANGE UP (ref 13–17)
HGB BLD-MCNC: 13.4 G/DL — SIGNIFICANT CHANGE UP (ref 13–17)
HIV 1 & 2 AB SERPL IA.RAPID: SIGNIFICANT CHANGE UP
HOROWITZ INDEX BLDA+IHG-RTO: 100 — SIGNIFICANT CHANGE UP
HOROWITZ INDEX BLDA+IHG-RTO: 50 — SIGNIFICANT CHANGE UP
HOROWITZ INDEX BLDA+IHG-RTO: 50 — SIGNIFICANT CHANGE UP
HOROWITZ INDEX BLDA+IHG-RTO: 60 — SIGNIFICANT CHANGE UP
HOROWITZ INDEX BLDA+IHG-RTO: SIGNIFICANT CHANGE UP
IMM GRANULOCYTES NFR BLD AUTO: 0.5 % — SIGNIFICANT CHANGE UP (ref 0–1.5)
IMM GRANULOCYTES NFR BLD AUTO: 3.7 % — HIGH (ref 0–1.5)
IMM GRANULOCYTES NFR BLD AUTO: 4 % — HIGH (ref 0–1.5)
IMM GRANULOCYTES NFR BLD AUTO: 5.2 % — HIGH (ref 0–1.5)
INR BLD: 1.24 RATIO — HIGH (ref 0.88–1.16)
INR BLD: 1.25 RATIO — HIGH (ref 0.88–1.16)
INR BLD: 1.28 RATIO — HIGH (ref 0.88–1.16)
INR BLD: 1.33 RATIO — HIGH (ref 0.88–1.16)
INR BLD: 1.44 RATIO — HIGH (ref 0.88–1.16)
INR BLD: 1.46 RATIO — HIGH (ref 0.88–1.16)
KETONES UR-MCNC: NEGATIVE — SIGNIFICANT CHANGE UP
LACTATE SERPL-SCNC: 1.5 MMOL/L — SIGNIFICANT CHANGE UP (ref 0.7–2)
LACTATE SERPL-SCNC: 1.6 MMOL/L — SIGNIFICANT CHANGE UP (ref 0.7–2)
LACTATE SERPL-SCNC: 2.6 MMOL/L — HIGH (ref 0.7–2)
LACTATE SERPL-SCNC: 21.2 MMOL/L — CRITICAL HIGH (ref 0.7–2)
LACTATE SERPL-SCNC: 4.5 MMOL/L — CRITICAL HIGH (ref 0.7–2)
LEUKOCYTE ESTERASE UR-ACNC: NEGATIVE — SIGNIFICANT CHANGE UP
LIDOCAIN IGE QN: 364 U/L — SIGNIFICANT CHANGE UP (ref 73–393)
LYMPHOCYTES # BLD AUTO: 0.36 K/UL — LOW (ref 1–3.3)
LYMPHOCYTES # BLD AUTO: 0.41 K/UL — LOW (ref 1–3.3)
LYMPHOCYTES # BLD AUTO: 0.49 K/UL — LOW (ref 1–3.3)
LYMPHOCYTES # BLD AUTO: 1.95 K/UL — SIGNIFICANT CHANGE UP (ref 1–3.3)
LYMPHOCYTES # BLD AUTO: 15 % — SIGNIFICANT CHANGE UP (ref 13–44)
LYMPHOCYTES # BLD AUTO: 16 % — SIGNIFICANT CHANGE UP (ref 13–44)
LYMPHOCYTES # BLD AUTO: 2.02 K/UL — SIGNIFICANT CHANGE UP (ref 1–3.3)
LYMPHOCYTES # BLD AUTO: 2.3 % — LOW (ref 13–44)
LYMPHOCYTES # BLD AUTO: 2.4 % — LOW (ref 13–44)
LYMPHOCYTES # BLD AUTO: 2.49 K/UL — SIGNIFICANT CHANGE UP (ref 1–3.3)
LYMPHOCYTES # BLD AUTO: 2.6 % — LOW (ref 13–44)
LYMPHOCYTES # BLD AUTO: 29.6 % — SIGNIFICANT CHANGE UP (ref 13–44)
MACROCYTES BLD QL: SLIGHT — SIGNIFICANT CHANGE UP
MAGNESIUM SERPL-MCNC: 1.7 MG/DL — SIGNIFICANT CHANGE UP (ref 1.6–2.6)
MAGNESIUM SERPL-MCNC: 2.4 MG/DL — SIGNIFICANT CHANGE UP (ref 1.6–2.6)
MANUAL SMEAR VERIFICATION: SIGNIFICANT CHANGE UP
MANUAL SMEAR VERIFICATION: SIGNIFICANT CHANGE UP
MCHC RBC-ENTMCNC: 30.2 GM/DL — LOW (ref 32–36)
MCHC RBC-ENTMCNC: 33.1 GM/DL — SIGNIFICANT CHANGE UP (ref 32–36)
MCHC RBC-ENTMCNC: 33.5 GM/DL — SIGNIFICANT CHANGE UP (ref 32–36)
MCHC RBC-ENTMCNC: 33.8 GM/DL — SIGNIFICANT CHANGE UP (ref 32–36)
MCHC RBC-ENTMCNC: 33.9 GM/DL — SIGNIFICANT CHANGE UP (ref 32–36)
MCHC RBC-ENTMCNC: 33.9 GM/DL — SIGNIFICANT CHANGE UP (ref 32–36)
MCHC RBC-ENTMCNC: 34.1 GM/DL — SIGNIFICANT CHANGE UP (ref 32–36)
MCHC RBC-ENTMCNC: 34.1 PG — HIGH (ref 27–34)
MCHC RBC-ENTMCNC: 34.2 GM/DL — SIGNIFICANT CHANGE UP (ref 32–36)
MCHC RBC-ENTMCNC: 34.4 PG — HIGH (ref 27–34)
MCHC RBC-ENTMCNC: 34.4 PG — HIGH (ref 27–34)
MCHC RBC-ENTMCNC: 34.6 PG — HIGH (ref 27–34)
MCHC RBC-ENTMCNC: 34.6 PG — HIGH (ref 27–34)
MCHC RBC-ENTMCNC: 34.7 PG — HIGH (ref 27–34)
MCHC RBC-ENTMCNC: 34.7 PG — HIGH (ref 27–34)
MCHC RBC-ENTMCNC: 34.9 PG — HIGH (ref 27–34)
MCV RBC AUTO: 100.8 FL — HIGH (ref 80–100)
MCV RBC AUTO: 101.1 FL — HIGH (ref 80–100)
MCV RBC AUTO: 101.9 FL — HIGH (ref 80–100)
MCV RBC AUTO: 101.9 FL — HIGH (ref 80–100)
MCV RBC AUTO: 102.3 FL — HIGH (ref 80–100)
MCV RBC AUTO: 102.6 FL — HIGH (ref 80–100)
MCV RBC AUTO: 105.6 FL — HIGH (ref 80–100)
MCV RBC AUTO: 114.5 FL — HIGH (ref 80–100)
METAMYELOCYTES # FLD: 2 % — HIGH (ref 0–0)
METHADONE UR-MCNC: NEGATIVE — SIGNIFICANT CHANGE UP
MONOCYTES # BLD AUTO: 0.48 K/UL — SIGNIFICANT CHANGE UP (ref 0–0.9)
MONOCYTES # BLD AUTO: 0.92 K/UL — HIGH (ref 0–0.9)
MONOCYTES # BLD AUTO: 0.93 K/UL — HIGH (ref 0–0.9)
MONOCYTES # BLD AUTO: 1.21 K/UL — HIGH (ref 0–0.9)
MONOCYTES # BLD AUTO: 1.35 K/UL — HIGH (ref 0–0.9)
MONOCYTES # BLD AUTO: 1.7 K/UL — HIGH (ref 0–0.9)
MONOCYTES NFR BLD AUTO: 10 % — SIGNIFICANT CHANGE UP (ref 2–14)
MONOCYTES NFR BLD AUTO: 14 % — SIGNIFICANT CHANGE UP (ref 2–14)
MONOCYTES NFR BLD AUTO: 3.1 % — SIGNIFICANT CHANGE UP (ref 2–14)
MONOCYTES NFR BLD AUTO: 6 % — SIGNIFICANT CHANGE UP (ref 2–14)
MONOCYTES NFR BLD AUTO: 7.2 % — SIGNIFICANT CHANGE UP (ref 2–14)
MONOCYTES NFR BLD AUTO: 9 % — SIGNIFICANT CHANGE UP (ref 2–14)
NEUTROPHILS # BLD AUTO: 11.84 K/UL — HIGH (ref 1.8–7.4)
NEUTROPHILS # BLD AUTO: 13.34 K/UL — HIGH (ref 1.8–7.4)
NEUTROPHILS # BLD AUTO: 14.39 K/UL — HIGH (ref 1.8–7.4)
NEUTROPHILS # BLD AUTO: 15.61 K/UL — HIGH (ref 1.8–7.4)
NEUTROPHILS # BLD AUTO: 3.52 K/UL — SIGNIFICANT CHANGE UP (ref 1.8–7.4)
NEUTROPHILS # BLD AUTO: 9.71 K/UL — HIGH (ref 1.8–7.4)
NEUTROPHILS NFR BLD AUTO: 53.3 % — SIGNIFICANT CHANGE UP (ref 43–77)
NEUTROPHILS NFR BLD AUTO: 65 % — SIGNIFICANT CHANGE UP (ref 43–77)
NEUTROPHILS NFR BLD AUTO: 71 % — SIGNIFICANT CHANGE UP (ref 43–77)
NEUTROPHILS NFR BLD AUTO: 82.8 % — HIGH (ref 43–77)
NEUTROPHILS NFR BLD AUTO: 85.1 % — HIGH (ref 43–77)
NEUTROPHILS NFR BLD AUTO: 87.1 % — HIGH (ref 43–77)
NEUTS BAND # BLD: 5 % — SIGNIFICANT CHANGE UP (ref 0–8)
NEUTS BAND # BLD: 7 % — SIGNIFICANT CHANGE UP (ref 0–8)
NITRITE UR-MCNC: NEGATIVE — SIGNIFICANT CHANGE UP
NRBC # BLD: 0 /100 WBCS — SIGNIFICANT CHANGE UP (ref 0–0)
NRBC # BLD: 0 /100 — SIGNIFICANT CHANGE UP (ref 0–0)
NRBC # BLD: 0 /100 — SIGNIFICANT CHANGE UP (ref 0–0)
NT-PROBNP SERPL-SCNC: 3119 PG/ML — HIGH (ref 0–300)
OPIATES UR-MCNC: NEGATIVE — SIGNIFICANT CHANGE UP
OVALOCYTES BLD QL SMEAR: SLIGHT — SIGNIFICANT CHANGE UP
PCO2 BLDA: 37 MMHG — SIGNIFICANT CHANGE UP (ref 35–48)
PCO2 BLDA: 40 MMHG — SIGNIFICANT CHANGE UP (ref 35–48)
PCO2 BLDA: 41 MMHG — SIGNIFICANT CHANGE UP (ref 35–48)
PCO2 BLDA: 45 MMHG — SIGNIFICANT CHANGE UP (ref 35–48)
PCO2 BLDA: 49 MMHG — HIGH (ref 35–48)
PCO2 BLDA: 53 MMHG — HIGH (ref 35–48)
PCO2 BLDA: 61 MMHG — HIGH (ref 35–48)
PCO2 BLDA: 64 MMHG — HIGH (ref 35–48)
PCP SPEC-MCNC: SIGNIFICANT CHANGE UP
PCP UR-MCNC: NEGATIVE — SIGNIFICANT CHANGE UP
PH BLDA: 7.25 — LOW (ref 7.35–7.45)
PH BLDA: 7.32 — LOW (ref 7.35–7.45)
PH BLDA: 7.37 — SIGNIFICANT CHANGE UP (ref 7.35–7.45)
PH BLDA: 7.37 — SIGNIFICANT CHANGE UP (ref 7.35–7.45)
PH BLDA: 7.42 — SIGNIFICANT CHANGE UP (ref 7.35–7.45)
PH BLDA: 7.42 — SIGNIFICANT CHANGE UP (ref 7.35–7.45)
PH BLDA: 7.45 — SIGNIFICANT CHANGE UP (ref 7.35–7.45)
PH BLDA: <6.93 — CRITICAL LOW (ref 7.35–7.45)
PH UR: 7 — SIGNIFICANT CHANGE UP (ref 5–8)
PHOSPHATE SERPL-MCNC: 4.2 MG/DL — SIGNIFICANT CHANGE UP (ref 2.5–4.5)
PHOSPHATE SERPL-MCNC: 4.7 MG/DL — HIGH (ref 2.5–4.5)
PLAT MORPH BLD: NORMAL — SIGNIFICANT CHANGE UP
PLAT MORPH BLD: NORMAL — SIGNIFICANT CHANGE UP
PLATELET # BLD AUTO: 103 K/UL — LOW (ref 150–400)
PLATELET # BLD AUTO: 40 K/UL — LOW (ref 150–400)
PLATELET # BLD AUTO: 43 K/UL — LOW (ref 150–400)
PLATELET # BLD AUTO: 44 K/UL — LOW (ref 150–400)
PLATELET # BLD AUTO: 48 K/UL — LOW (ref 150–400)
PLATELET # BLD AUTO: 49 K/UL — LOW (ref 150–400)
PLATELET # BLD AUTO: 53 K/UL — LOW (ref 150–400)
PLATELET # BLD AUTO: 57 K/UL — LOW (ref 150–400)
PO2 BLDA: 110 MMHG — HIGH (ref 83–108)
PO2 BLDA: 127 MMHG — HIGH (ref 83–108)
PO2 BLDA: 134 MMHG — HIGH (ref 83–108)
PO2 BLDA: 154 MMHG — HIGH (ref 83–108)
PO2 BLDA: 176 MMHG — HIGH (ref 83–108)
PO2 BLDA: 308 MMHG — HIGH (ref 83–108)
PO2 BLDA: 74 MMHG — LOW (ref 83–108)
PO2 BLDA: 90 MMHG — SIGNIFICANT CHANGE UP (ref 83–108)
POLYCHROMASIA BLD QL SMEAR: SLIGHT — SIGNIFICANT CHANGE UP
POTASSIUM SERPL-MCNC: 3.1 MMOL/L — LOW (ref 3.5–5.3)
POTASSIUM SERPL-MCNC: 3.2 MMOL/L — LOW (ref 3.5–5.3)
POTASSIUM SERPL-MCNC: 3.2 MMOL/L — LOW (ref 3.5–5.3)
POTASSIUM SERPL-MCNC: 3.5 MMOL/L — SIGNIFICANT CHANGE UP (ref 3.5–5.3)
POTASSIUM SERPL-MCNC: 3.6 MMOL/L — SIGNIFICANT CHANGE UP (ref 3.5–5.3)
POTASSIUM SERPL-MCNC: 3.7 MMOL/L — SIGNIFICANT CHANGE UP (ref 3.5–5.3)
POTASSIUM SERPL-MCNC: 4.3 MMOL/L — SIGNIFICANT CHANGE UP (ref 3.5–5.3)
POTASSIUM SERPL-MCNC: 4.7 MMOL/L — SIGNIFICANT CHANGE UP (ref 3.5–5.3)
POTASSIUM SERPL-SCNC: 3.1 MMOL/L — LOW (ref 3.5–5.3)
POTASSIUM SERPL-SCNC: 3.2 MMOL/L — LOW (ref 3.5–5.3)
POTASSIUM SERPL-SCNC: 3.2 MMOL/L — LOW (ref 3.5–5.3)
POTASSIUM SERPL-SCNC: 3.5 MMOL/L — SIGNIFICANT CHANGE UP (ref 3.5–5.3)
POTASSIUM SERPL-SCNC: 3.6 MMOL/L — SIGNIFICANT CHANGE UP (ref 3.5–5.3)
POTASSIUM SERPL-SCNC: 3.7 MMOL/L — SIGNIFICANT CHANGE UP (ref 3.5–5.3)
POTASSIUM SERPL-SCNC: 4.3 MMOL/L — SIGNIFICANT CHANGE UP (ref 3.5–5.3)
POTASSIUM SERPL-SCNC: 4.7 MMOL/L — SIGNIFICANT CHANGE UP (ref 3.5–5.3)
PROT SERPL-MCNC: 7 G/DL — SIGNIFICANT CHANGE UP (ref 6–8.3)
PROT SERPL-MCNC: 7.1 G/DL — SIGNIFICANT CHANGE UP (ref 6–8.3)
PROT SERPL-MCNC: 7.4 G/DL — SIGNIFICANT CHANGE UP (ref 6–8.3)
PROT SERPL-MCNC: 8.1 G/DL — SIGNIFICANT CHANGE UP (ref 6–8.3)
PROT UR-MCNC: 500 MG/DL
PROTHROM AB SERPL-ACNC: 14.8 SEC — HIGH (ref 10.6–13.6)
PROTHROM AB SERPL-ACNC: 15 SEC — HIGH (ref 10.6–13.6)
PROTHROM AB SERPL-ACNC: 15.3 SEC — HIGH (ref 10.6–13.6)
PROTHROM AB SERPL-ACNC: 15.9 SEC — HIGH (ref 10.6–13.6)
PROTHROM AB SERPL-ACNC: 17.1 SEC — HIGH (ref 10.6–13.6)
PROTHROM AB SERPL-ACNC: 17.3 SEC — HIGH (ref 10.6–13.6)
RBC # BLD: 3.41 M/UL — LOW (ref 4.2–5.8)
RBC # BLD: 3.58 M/UL — LOW (ref 4.2–5.8)
RBC # BLD: 3.69 M/UL — LOW (ref 4.2–5.8)
RBC # BLD: 3.73 M/UL — LOW (ref 4.2–5.8)
RBC # BLD: 3.75 M/UL — LOW (ref 4.2–5.8)
RBC # BLD: 3.77 M/UL — LOW (ref 4.2–5.8)
RBC # BLD: 3.84 M/UL — LOW (ref 4.2–5.8)
RBC # BLD: 3.86 M/UL — LOW (ref 4.2–5.8)
RBC # FLD: 11.6 % — SIGNIFICANT CHANGE UP (ref 10.3–14.5)
RBC # FLD: 12.3 % — SIGNIFICANT CHANGE UP (ref 10.3–14.5)
RBC # FLD: 12.3 % — SIGNIFICANT CHANGE UP (ref 10.3–14.5)
RBC # FLD: 12.4 % — SIGNIFICANT CHANGE UP (ref 10.3–14.5)
RBC # FLD: 12.4 % — SIGNIFICANT CHANGE UP (ref 10.3–14.5)
RBC # FLD: 12.5 % — SIGNIFICANT CHANGE UP (ref 10.3–14.5)
RBC # FLD: 12.5 % — SIGNIFICANT CHANGE UP (ref 10.3–14.5)
RBC # FLD: 12.7 % — SIGNIFICANT CHANGE UP (ref 10.3–14.5)
RBC BLD AUTO: ABNORMAL
RBC BLD AUTO: NORMAL — SIGNIFICANT CHANGE UP
RBC CASTS # UR COMP ASSIST: ABNORMAL /HPF (ref 0–4)
SAO2 % BLDA: 94.9 % — SIGNIFICANT CHANGE UP (ref 94–98)
SAO2 % BLDA: 98.3 % — HIGH (ref 94–98)
SAO2 % BLDA: 98.6 % — HIGH (ref 94–98)
SAO2 % BLDA: 98.9 % — HIGH (ref 94–98)
SAO2 % BLDA: 99.3 % — HIGH (ref 94–98)
SAO2 % BLDA: 99.6 % — HIGH (ref 94–98)
SAO2 % BLDA: 99.8 % — HIGH (ref 94–98)
SAO2 % BLDA: 99.9 % — HIGH (ref 94–98)
SARS-COV-2 IGG+IGM SERPL QL IA: 1.99 INDEX — HIGH
SARS-COV-2 IGG+IGM SERPL QL IA: >250 U/ML — HIGH
SARS-COV-2 IGG+IGM SERPL QL IA: POSITIVE
SARS-COV-2 IGG+IGM SERPL QL IA: POSITIVE
SARS-COV-2 RNA SPEC QL NAA+PROBE: SIGNIFICANT CHANGE UP
SODIUM SERPL-SCNC: 138 MMOL/L — SIGNIFICANT CHANGE UP (ref 135–145)
SODIUM SERPL-SCNC: 140 MMOL/L — SIGNIFICANT CHANGE UP (ref 135–145)
SODIUM SERPL-SCNC: 140 MMOL/L — SIGNIFICANT CHANGE UP (ref 135–145)
SODIUM SERPL-SCNC: 141 MMOL/L — SIGNIFICANT CHANGE UP (ref 135–145)
SODIUM SERPL-SCNC: 144 MMOL/L — SIGNIFICANT CHANGE UP (ref 135–145)
SODIUM SERPL-SCNC: 144 MMOL/L — SIGNIFICANT CHANGE UP (ref 135–145)
SODIUM SERPL-SCNC: 145 MMOL/L — SIGNIFICANT CHANGE UP (ref 135–145)
SODIUM SERPL-SCNC: 145 MMOL/L — SIGNIFICANT CHANGE UP (ref 135–145)
SP GR SPEC: 1.01 — SIGNIFICANT CHANGE UP (ref 1.01–1.02)
SPECIMEN SOURCE: SIGNIFICANT CHANGE UP
THC UR QL: NEGATIVE — SIGNIFICANT CHANGE UP
TROPONIN I, HIGH SENSITIVITY RESULT: 278.5 NG/L — HIGH
TROPONIN I, HIGH SENSITIVITY RESULT: 484.6 NG/L — HIGH
TROPONIN I, HIGH SENSITIVITY RESULT: 544.5 NG/L — HIGH
TROPONIN I, HIGH SENSITIVITY RESULT: 658 NG/L — HIGH
TROPONIN I, HIGH SENSITIVITY RESULT: 936 NG/L — HIGH
UROBILINOGEN FLD QL: NEGATIVE — SIGNIFICANT CHANGE UP
WBC # BLD: 11.61 K/UL — HIGH (ref 3.8–10.5)
WBC # BLD: 13.49 K/UL — HIGH (ref 3.8–10.5)
WBC # BLD: 15.34 K/UL — HIGH (ref 3.8–10.5)
WBC # BLD: 15.58 K/UL — HIGH (ref 3.8–10.5)
WBC # BLD: 16.57 K/UL — HIGH (ref 3.8–10.5)
WBC # BLD: 16.91 K/UL — HIGH (ref 3.8–10.5)
WBC # BLD: 18.85 K/UL — HIGH (ref 3.8–10.5)
WBC # BLD: 6.59 K/UL — SIGNIFICANT CHANGE UP (ref 3.8–10.5)
WBC # FLD AUTO: 11.61 K/UL — HIGH (ref 3.8–10.5)
WBC # FLD AUTO: 13.49 K/UL — HIGH (ref 3.8–10.5)
WBC # FLD AUTO: 15.34 K/UL — HIGH (ref 3.8–10.5)
WBC # FLD AUTO: 15.58 K/UL — HIGH (ref 3.8–10.5)
WBC # FLD AUTO: 16.57 K/UL — HIGH (ref 3.8–10.5)
WBC # FLD AUTO: 16.91 K/UL — HIGH (ref 3.8–10.5)
WBC # FLD AUTO: 18.85 K/UL — HIGH (ref 3.8–10.5)
WBC # FLD AUTO: 6.59 K/UL — SIGNIFICANT CHANGE UP (ref 3.8–10.5)
WBC UR QL: SIGNIFICANT CHANGE UP /HPF (ref 0–5)

## 2021-01-01 PROCEDURE — 96374 THER/PROPH/DIAG INJ IV PUSH: CPT | Mod: XU

## 2021-01-01 PROCEDURE — 92950 HEART/LUNG RESUSCITATION CPR: CPT

## 2021-01-01 PROCEDURE — 80053 COMPREHEN METABOLIC PANEL: CPT

## 2021-01-01 PROCEDURE — 86769 SARS-COV-2 COVID-19 ANTIBODY: CPT

## 2021-01-01 PROCEDURE — 71045 X-RAY EXAM CHEST 1 VIEW: CPT | Mod: 26

## 2021-01-01 PROCEDURE — 93010 ELECTROCARDIOGRAM REPORT: CPT

## 2021-01-01 PROCEDURE — 83735 ASSAY OF MAGNESIUM: CPT

## 2021-01-01 PROCEDURE — 70450 CT HEAD/BRAIN W/O DYE: CPT | Mod: MA

## 2021-01-01 PROCEDURE — 36600 WITHDRAWAL OF ARTERIAL BLOOD: CPT

## 2021-01-01 PROCEDURE — 86703 HIV-1/HIV-2 1 RESULT ANTBDY: CPT

## 2021-01-01 PROCEDURE — 36415 COLL VENOUS BLD VENIPUNCTURE: CPT

## 2021-01-01 PROCEDURE — 82803 BLOOD GASES ANY COMBINATION: CPT

## 2021-01-01 PROCEDURE — 71045 X-RAY EXAM CHEST 1 VIEW: CPT

## 2021-01-01 PROCEDURE — 80074 ACUTE HEPATITIS PANEL: CPT

## 2021-01-01 PROCEDURE — 85027 COMPLETE CBC AUTOMATED: CPT

## 2021-01-01 PROCEDURE — 99292 CRITICAL CARE ADDL 30 MIN: CPT | Mod: 25

## 2021-01-01 PROCEDURE — 86901 BLOOD TYPING SEROLOGIC RH(D): CPT

## 2021-01-01 PROCEDURE — 80048 BASIC METABOLIC PNL TOTAL CA: CPT

## 2021-01-01 PROCEDURE — 93306 TTE W/DOPPLER COMPLETE: CPT | Mod: 26

## 2021-01-01 PROCEDURE — 83605 ASSAY OF LACTIC ACID: CPT

## 2021-01-01 PROCEDURE — 84484 ASSAY OF TROPONIN QUANT: CPT

## 2021-01-01 PROCEDURE — C1751: CPT

## 2021-01-01 PROCEDURE — 87077 CULTURE AEROBIC IDENTIFY: CPT

## 2021-01-01 PROCEDURE — 80307 DRUG TEST PRSMV CHEM ANLYZR: CPT

## 2021-01-01 PROCEDURE — 82550 ASSAY OF CK (CPK): CPT

## 2021-01-01 PROCEDURE — 99291 CRITICAL CARE FIRST HOUR: CPT | Mod: 25

## 2021-01-01 PROCEDURE — 93306 TTE W/DOPPLER COMPLETE: CPT

## 2021-01-01 PROCEDURE — 81001 URINALYSIS AUTO W/SCOPE: CPT

## 2021-01-01 PROCEDURE — 70450 CT HEAD/BRAIN W/O DYE: CPT | Mod: 26,MA

## 2021-01-01 PROCEDURE — 99232 SBSQ HOSP IP/OBS MODERATE 35: CPT

## 2021-01-01 PROCEDURE — 99285 EMERGENCY DEPT VISIT HI MDM: CPT | Mod: 25

## 2021-01-01 PROCEDURE — 94002 VENT MGMT INPAT INIT DAY: CPT

## 2021-01-01 PROCEDURE — 82330 ASSAY OF CALCIUM: CPT

## 2021-01-01 PROCEDURE — 96360 HYDRATION IV INFUSION INIT: CPT

## 2021-01-01 PROCEDURE — 85610 PROTHROMBIN TIME: CPT

## 2021-01-01 PROCEDURE — G1004: CPT

## 2021-01-01 PROCEDURE — 72125 CT NECK SPINE W/O DYE: CPT | Mod: 26,MG

## 2021-01-01 PROCEDURE — 36556 INSERT NON-TUNNEL CV CATH: CPT

## 2021-01-01 PROCEDURE — 82962 GLUCOSE BLOOD TEST: CPT

## 2021-01-01 PROCEDURE — 99284 EMERGENCY DEPT VISIT MOD MDM: CPT

## 2021-01-01 PROCEDURE — 87040 BLOOD CULTURE FOR BACTERIA: CPT

## 2021-01-01 PROCEDURE — 85730 THROMBOPLASTIN TIME PARTIAL: CPT

## 2021-01-01 PROCEDURE — 72125 CT NECK SPINE W/O DYE: CPT | Mod: MG

## 2021-01-01 PROCEDURE — 83880 ASSAY OF NATRIURETIC PEPTIDE: CPT

## 2021-01-01 PROCEDURE — 86900 BLOOD TYPING SEROLOGIC ABO: CPT

## 2021-01-01 PROCEDURE — 86850 RBC ANTIBODY SCREEN: CPT

## 2021-01-01 PROCEDURE — 99291 CRITICAL CARE FIRST HOUR: CPT

## 2021-01-01 PROCEDURE — 85384 FIBRINOGEN ACTIVITY: CPT

## 2021-01-01 PROCEDURE — 99292 CRITICAL CARE ADDL 30 MIN: CPT

## 2021-01-01 PROCEDURE — 87635 SARS-COV-2 COVID-19 AMP PRB: CPT

## 2021-01-01 PROCEDURE — 83036 HEMOGLOBIN GLYCOSYLATED A1C: CPT

## 2021-01-01 PROCEDURE — 85025 COMPLETE CBC W/AUTO DIFF WBC: CPT

## 2021-01-01 PROCEDURE — 99254 IP/OBS CNSLTJ NEW/EST MOD 60: CPT

## 2021-01-01 PROCEDURE — 99053 MED SERV 10PM-8AM 24 HR FAC: CPT

## 2021-01-01 PROCEDURE — 83690 ASSAY OF LIPASE: CPT

## 2021-01-01 PROCEDURE — 84100 ASSAY OF PHOSPHORUS: CPT

## 2021-01-01 PROCEDURE — 93005 ELECTROCARDIOGRAM TRACING: CPT

## 2021-01-01 PROCEDURE — 82553 CREATINE MB FRACTION: CPT

## 2021-01-01 RX ORDER — SODIUM BICARBONATE 1 MEQ/ML
50 SYRINGE (ML) INTRAVENOUS ONCE
Refills: 0 | Status: COMPLETED | OUTPATIENT
Start: 2021-01-01 | End: 2021-01-01

## 2021-01-01 RX ORDER — NOREPINEPHRINE BITARTRATE/D5W 8 MG/250ML
1.5 PLASTIC BAG, INJECTION (ML) INTRAVENOUS
Qty: 16 | Refills: 0 | Status: DISCONTINUED | OUTPATIENT
Start: 2021-01-01 | End: 2021-01-01

## 2021-01-01 RX ORDER — HEPARIN SODIUM 5000 [USP'U]/ML
5000 INJECTION INTRAVENOUS; SUBCUTANEOUS EVERY 8 HOURS
Refills: 0 | Status: DISCONTINUED | OUTPATIENT
Start: 2021-01-01 | End: 2021-01-01

## 2021-01-01 RX ORDER — INSULIN LISPRO 100/ML
VIAL (ML) SUBCUTANEOUS EVERY 4 HOURS
Refills: 0 | Status: DISCONTINUED | OUTPATIENT
Start: 2021-01-01 | End: 2021-01-01

## 2021-01-01 RX ORDER — VALACYCLOVIR 500 MG/1
1000 TABLET, FILM COATED ORAL EVERY 12 HOURS
Refills: 0 | Status: DISCONTINUED | OUTPATIENT
Start: 2021-01-01 | End: 2021-01-01

## 2021-01-01 RX ORDER — SODIUM CHLORIDE 9 MG/ML
10 INJECTION INTRAMUSCULAR; INTRAVENOUS; SUBCUTANEOUS
Refills: 0 | Status: DISCONTINUED | OUTPATIENT
Start: 2021-01-01 | End: 2021-01-01

## 2021-01-01 RX ORDER — HYDROCORTISONE 20 MG
100 TABLET ORAL ONCE
Refills: 0 | Status: COMPLETED | OUTPATIENT
Start: 2021-01-01 | End: 2021-01-01

## 2021-01-01 RX ORDER — CHLORHEXIDINE GLUCONATE 213 G/1000ML
1 SOLUTION TOPICAL
Refills: 0 | Status: DISCONTINUED | OUTPATIENT
Start: 2021-01-01 | End: 2021-01-01

## 2021-01-01 RX ORDER — GLUCAGON INJECTION, SOLUTION 0.5 MG/.1ML
1 INJECTION, SOLUTION SUBCUTANEOUS ONCE
Refills: 0 | Status: DISCONTINUED | OUTPATIENT
Start: 2021-01-01 | End: 2021-01-01

## 2021-01-01 RX ORDER — NOREPINEPHRINE BITARTRATE/D5W 8 MG/250ML
2.2 PLASTIC BAG, INJECTION (ML) INTRAVENOUS
Qty: 32 | Refills: 0 | Status: DISCONTINUED | OUTPATIENT
Start: 2021-01-01 | End: 2021-01-01

## 2021-01-01 RX ORDER — MAGNESIUM SULFATE 500 MG/ML
2 VIAL (ML) INJECTION ONCE
Refills: 0 | Status: COMPLETED | OUTPATIENT
Start: 2021-01-01 | End: 2021-01-01

## 2021-01-01 RX ORDER — PROPOFOL 10 MG/ML
10 INJECTION, EMULSION INTRAVENOUS
Qty: 1000 | Refills: 0 | Status: DISCONTINUED | OUTPATIENT
Start: 2021-01-01 | End: 2021-01-01

## 2021-01-01 RX ORDER — PROPOFOL 10 MG/ML
250 INJECTION, EMULSION INTRAVENOUS ONCE
Refills: 0 | Status: DISCONTINUED | OUTPATIENT
Start: 2021-01-01 | End: 2021-01-01

## 2021-01-01 RX ORDER — POTASSIUM CHLORIDE 20 MEQ
10 PACKET (EA) ORAL
Refills: 0 | Status: COMPLETED | OUTPATIENT
Start: 2021-01-01 | End: 2021-01-01

## 2021-01-01 RX ORDER — PHENYLEPHRINE HYDROCHLORIDE 10 MG/ML
9 INJECTION INTRAVENOUS
Qty: 160 | Refills: 0 | Status: DISCONTINUED | OUTPATIENT
Start: 2021-01-01 | End: 2021-01-01

## 2021-01-01 RX ORDER — SODIUM CHLORIDE 9 MG/ML
1000 INJECTION INTRAMUSCULAR; INTRAVENOUS; SUBCUTANEOUS ONCE
Refills: 0 | Status: COMPLETED | OUTPATIENT
Start: 2021-01-01 | End: 2021-01-01

## 2021-01-01 RX ORDER — INSULIN HUMAN 100 [IU]/ML
4 INJECTION, SOLUTION SUBCUTANEOUS
Qty: 50 | Refills: 0 | Status: DISCONTINUED | OUTPATIENT
Start: 2021-01-01 | End: 2021-01-01

## 2021-01-01 RX ORDER — SODIUM CHLORIDE 9 MG/ML
1000 INJECTION, SOLUTION INTRAVENOUS
Refills: 0 | Status: DISCONTINUED | OUTPATIENT
Start: 2021-01-01 | End: 2021-01-01

## 2021-01-01 RX ORDER — DEXTROSE 50 % IN WATER 50 %
25 SYRINGE (ML) INTRAVENOUS ONCE
Refills: 0 | Status: DISCONTINUED | OUTPATIENT
Start: 2021-01-01 | End: 2021-01-01

## 2021-01-01 RX ORDER — CHLORHEXIDINE GLUCONATE 213 G/1000ML
15 SOLUTION TOPICAL EVERY 12 HOURS
Refills: 0 | Status: DISCONTINUED | OUTPATIENT
Start: 2021-01-01 | End: 2021-01-01

## 2021-01-01 RX ORDER — NOREPINEPHRINE BITARTRATE/D5W 8 MG/250ML
0.05 PLASTIC BAG, INJECTION (ML) INTRAVENOUS
Qty: 8 | Refills: 0 | Status: DISCONTINUED | OUTPATIENT
Start: 2021-01-01 | End: 2021-01-01

## 2021-01-01 RX ORDER — MEPERIDINE HYDROCHLORIDE 50 MG/ML
12.5 INJECTION INTRAMUSCULAR; INTRAVENOUS; SUBCUTANEOUS
Refills: 0 | Status: DISCONTINUED | OUTPATIENT
Start: 2021-01-01 | End: 2021-01-01

## 2021-01-01 RX ORDER — DEXTROSE 50 % IN WATER 50 %
15 SYRINGE (ML) INTRAVENOUS ONCE
Refills: 0 | Status: DISCONTINUED | OUTPATIENT
Start: 2021-01-01 | End: 2021-01-01

## 2021-01-01 RX ORDER — SODIUM BICARBONATE 1 MEQ/ML
0.18 SYRINGE (ML) INTRAVENOUS
Qty: 150 | Refills: 0 | Status: DISCONTINUED | OUTPATIENT
Start: 2021-01-01 | End: 2021-01-01

## 2021-01-01 RX ORDER — POTASSIUM CHLORIDE 20 MEQ
10 PACKET (EA) ORAL
Refills: 0 | Status: DISCONTINUED | OUTPATIENT
Start: 2021-01-01 | End: 2021-01-01

## 2021-01-01 RX ORDER — ENOXAPARIN SODIUM 100 MG/ML
40 INJECTION SUBCUTANEOUS DAILY
Refills: 0 | Status: DISCONTINUED | OUTPATIENT
Start: 2021-01-01 | End: 2021-01-01

## 2021-01-01 RX ORDER — PANTOPRAZOLE SODIUM 20 MG/1
40 TABLET, DELAYED RELEASE ORAL
Refills: 0 | Status: DISCONTINUED | OUTPATIENT
Start: 2021-01-01 | End: 2021-01-01

## 2021-01-01 RX ADMIN — SODIUM CHLORIDE 2000 MILLILITER(S): 9 INJECTION INTRAMUSCULAR; INTRAVENOUS; SUBCUTANEOUS at 03:12

## 2021-01-01 RX ADMIN — Medication 173 MICROGRAM(S)/KG/MIN: at 09:00

## 2021-01-01 RX ADMIN — Medication 46.1 MICROGRAM(S)/KG/MIN: at 10:58

## 2021-01-01 RX ADMIN — CHLORHEXIDINE GLUCONATE 1 APPLICATION(S): 213 SOLUTION TOPICAL at 06:35

## 2021-01-01 RX ADMIN — CHLORHEXIDINE GLUCONATE 15 MILLILITER(S): 213 SOLUTION TOPICAL at 05:46

## 2021-01-01 RX ADMIN — Medication 100 MILLIEQUIVALENT(S): at 08:56

## 2021-01-01 RX ADMIN — Medication 1 DROP(S): at 05:46

## 2021-01-01 RX ADMIN — Medication 100 MILLIEQUIVALENT(S): at 19:38

## 2021-01-01 RX ADMIN — Medication 100 MILLIEQUIVALENT(S): at 10:19

## 2021-01-01 RX ADMIN — PANTOPRAZOLE SODIUM 40 MILLIGRAM(S): 20 TABLET, DELAYED RELEASE ORAL at 17:59

## 2021-01-01 RX ADMIN — Medication 50 MILLIEQUIVALENT(S): at 19:14

## 2021-01-01 RX ADMIN — SODIUM CHLORIDE 2000 MILLILITER(S): 9 INJECTION INTRAMUSCULAR; INTRAVENOUS; SUBCUTANEOUS at 07:07

## 2021-01-01 RX ADMIN — PANTOPRAZOLE SODIUM 40 MILLIGRAM(S): 20 TABLET, DELAYED RELEASE ORAL at 05:46

## 2021-01-01 RX ADMIN — INSULIN HUMAN 2 UNIT(S)/HR: 100 INJECTION, SOLUTION SUBCUTANEOUS at 04:34

## 2021-01-01 RX ADMIN — Medication 50 MILLIEQUIVALENT(S): at 01:19

## 2021-01-01 RX ADMIN — PROPOFOL 7.38 MICROGRAM(S)/KG/MIN: 10 INJECTION, EMULSION INTRAVENOUS at 21:46

## 2021-01-01 RX ADMIN — CHLORHEXIDINE GLUCONATE 15 MILLILITER(S): 213 SOLUTION TOPICAL at 17:59

## 2021-01-01 RX ADMIN — Medication 150 MEQ/KG/HR: at 20:23

## 2021-01-01 RX ADMIN — Medication 100 MILLIEQUIVALENT(S): at 20:19

## 2021-01-01 RX ADMIN — Medication 100 MILLIEQUIVALENT(S): at 21:37

## 2021-01-01 RX ADMIN — INSULIN HUMAN 4 UNIT(S)/HR: 100 INJECTION, SOLUTION SUBCUTANEOUS at 06:41

## 2021-01-01 RX ADMIN — Medication 100 MILLIEQUIVALENT(S): at 11:21

## 2021-01-01 RX ADMIN — PHENYLEPHRINE HYDROCHLORIDE 23.1 MICROGRAM(S)/KG/MIN: 10 INJECTION INTRAVENOUS at 09:07

## 2021-01-01 RX ADMIN — Medication 1 APPLICATION(S): at 05:50

## 2021-01-01 RX ADMIN — PROPOFOL 7.38 MICROGRAM(S)/KG/MIN: 10 INJECTION, EMULSION INTRAVENOUS at 01:17

## 2021-01-01 RX ADMIN — Medication 50 MILLIEQUIVALENT(S): at 01:35

## 2021-01-01 RX ADMIN — Medication 11.5 MICROGRAM(S)/KG/MIN: at 04:57

## 2021-01-01 RX ADMIN — Medication 1 APPLICATION(S): at 17:59

## 2021-01-01 RX ADMIN — Medication 50 GRAM(S): at 01:10

## 2021-01-01 RX ADMIN — Medication 100 MILLIEQUIVALENT(S): at 08:20

## 2021-01-01 RX ADMIN — Medication 100 MILLIGRAM(S): at 21:27

## 2021-01-01 RX ADMIN — Medication 1 DROP(S): at 05:51

## 2021-01-01 RX ADMIN — Medication 11.5 MICROGRAM(S)/KG/MIN: at 19:27

## 2021-01-01 RX ADMIN — CHLORHEXIDINE GLUCONATE 1 APPLICATION(S): 213 SOLUTION TOPICAL at 06:15

## 2021-01-01 RX ADMIN — PANTOPRAZOLE SODIUM 40 MILLIGRAM(S): 20 TABLET, DELAYED RELEASE ORAL at 05:50

## 2021-01-01 RX ADMIN — SODIUM CHLORIDE 75 MILLILITER(S): 9 INJECTION, SOLUTION INTRAVENOUS at 13:39

## 2021-01-01 RX ADMIN — Medication 1 DROP(S): at 17:59

## 2021-01-01 RX ADMIN — Medication 5.77 MICROGRAM(S)/KG/MIN: at 10:27

## 2021-01-01 RX ADMIN — PHENYLEPHRINE HYDROCHLORIDE 208 MICROGRAM(S)/KG/MIN: 10 INJECTION INTRAVENOUS at 11:40

## 2021-01-01 RX ADMIN — Medication 100 MILLIEQUIVALENT(S): at 07:44

## 2021-01-01 RX ADMIN — SODIUM CHLORIDE 1000 MILLILITER(S): 9 INJECTION INTRAMUSCULAR; INTRAVENOUS; SUBCUTANEOUS at 04:12

## 2021-01-01 RX ADMIN — Medication 150 MEQ/KG/HR: at 03:53

## 2021-01-01 RX ADMIN — Medication 100 MILLIEQUIVALENT(S): at 10:38

## 2021-01-01 RX ADMIN — CHLORHEXIDINE GLUCONATE 15 MILLILITER(S): 213 SOLUTION TOPICAL at 05:50

## 2021-02-27 NOTE — ED PROVIDER NOTE - CARE PLAN
Principal Discharge DX:	Bleeding from varicose veins of lower extremity, left   Principal Discharge DX:	Bleeding from varicose veins of lower extremity, left  Secondary Diagnosis:	Acute alcoholic intoxication without complication

## 2021-02-27 NOTE — ED PROVIDER NOTE - PATIENT PORTAL LINK FT
You can access the FollowMyHealth Patient Portal offered by [facility] by registering at the following website: [email or web address]. By joining [facility]’s FollowMyHealth portal, you will also be able to view your health information using other applications (apps) compatible with our system.

## 2021-02-27 NOTE — ED PROVIDER NOTE - CLINICAL SUMMARY MEDICAL DECISION MAKING FREE TEXT BOX
pt p/w bleeding from let leg varicose vein s/p minor trauma scratching skin in sleep while intoxicated, bleeding vein was dressed using surgiseal and compression dressing with ace bandage, pt was observed in ed until sober enough to go home, he was advised not remove bandage for 24 hours and do not pull off surgiseal .

## 2021-02-27 NOTE — ED PROVIDER NOTE - OBJECTIVE STATEMENT
41 y/o male with h/o varicose veins BIB EMS c/o sudden onset bleeding from left leg profusely from one varicose veins, states he scratched his leg and it started bleeding. denies any use of oral anticoagulation medication.

## 2021-02-27 NOTE — ED PROVIDER NOTE - NSFOLLOWUPINSTRUCTIONS_ED_ALL_ED_FT
keep bandage on for 24 hours  after that you can remove ace bandage but do not pull off gauze, you can soak in warm water and let it come off on its own.   wear compression dressing   follow up with your own doctor in 2-3 days  return to ED if any problem or concern.

## 2021-02-27 NOTE — ED PROVIDER NOTE - PHYSICAL EXAMINATION
General:     NAD, well-nourished, well-appearing  Head:     NC/AT, EOMI, oral mucosa moist  Neck:     supple  Lungs:     CTA b/l, no w/r/r  CVS:     S1S2, RRR, no m/g/r  Abd:     +BS, s/nt/nd, no organomegaly  Ext:    2+ radial and pedal pulses, no c/c/e, active bleeding from varicose vein on left leg , spurting blood  Neuro: grossly intact

## 2021-11-04 NOTE — ED ADULT TRIAGE NOTE - MEANS OF ARRIVAL
- Metoprolol increased to 50 mg tid but BP lower now and will decrease to twice daily and discontinue hydralazine.  - Lisinopril discontinued given worsening renal function and nifedipine added.  - Gradually improving.  - Will need Ace or ARB eventually.   stretcher

## 2021-11-04 NOTE — PROVIDER CONTACT NOTE (EICU) - BACKGROUND
42 yo M found unresponsive in bathroom, 25 minute CPR by EMS with ROSC, brief loss of pulses in ED, with ROSC again. EMS/ED unable to get much history from family. Reportedly no response to Narcan. Intubated on levophed. Labs and imaging pending. EKG reported as sinus tach without gross ST elevations or depressions.

## 2021-11-04 NOTE — H&P ADULT - HISTORY OF PRESENT ILLNESS
41 yr old male with hx of ETOH and drug abuse presents s/p cardiac arrest. Girlfriend report pt was lethargic today, and her daughter heard pt hitting on bathroom door. pt was sitting on toliet shaking when bathroom door opened, and then fell off, hitting head on bathtub. EMS called, and pt had no pulse, in field, pt was intubated, and given 4 rounds of epi, calcium and bicarb, and ROSC obtained.    pt admitted to ICU , ECG reviewd, pt on full vent support, pressor support . CT scan done with acute finding. Pt started on Hypothermic protocol in ER at 2017.   pt admitted to ICU 2250.

## 2021-11-04 NOTE — PROGRESS NOTE ADULT - SUBJECTIVE AND OBJECTIVE BOX
41 yr old male with hx of ETOH and drug abuse presents s/p cardiac arrest. Girlfriend report pt was lethargic today, and her daughter heard pt hitting on bathroom door. pt was sitting on toliet shaking when bathroom door opened, and then fell off, hitting head on bathtub. EMS called, and pt had no pulse, in field, pt was intubated, and given 4 rounds of epi, calcium and bicarb, and ROSC obtained.  Case discussed with the PA.  Clinically unstable for transfer at this time. CT head significant for anoxia, cooling protocol initiated, family informed of poor prognosis.        ICU Vital Signs Last 24 Hrs  T(C): 36.6 (04 Nov 2021 19:58), Max: 36.6 (04 Nov 2021 19:58)  T(F): 97.9 (04 Nov 2021 19:58), Max: 97.9 (04 Nov 2021 19:58)  HR: 128 (04 Nov 2021 20:19) (54 - 135)  BP: 156/100 (04 Nov 2021 20:19) (69/52 - 156/100)  BP(mean): 104 (04 Nov 2021 20:11) (70 - 104)  RR: 26 (04 Nov 2021 20:19) (19 - 26)  SpO2: 100% (04 Nov 2021 20:19) (93% - 100%)    ABG - ( 04 Nov 2021 19:00 )  pH, Arterial: <6.93 pH, Blood: x     /  pCO2: 61    /  pO2: 176   / HCO3: x     / Base Excess: x     /  SaO2: 99.9                                        12.4   13.49 )-----------( 57       ( 04 Nov 2021 18:40 )             41.0     11-04    140  |  98  |  18  ----------------------------<  234<H>  4.3   |  11<L>  |  2.31<H>    Ca    10.5      04 Nov 2021 18:40    TPro  7.4  /  Alb  3.1<L>  /  TBili  1.6<H>  /  DBili  x   /  AST  228<H>  /  ALT  68<H>  /  AlkPhos  116  11-04    LIVER FUNCTIONS - ( 04 Nov 2021 18:40 )  Alb: 3.1 g/dL / Pro: 7.4 g/dL / ALK PHOS: 116 U/L / ALT: 68 U/L / AST: 228 U/L / GGT: x             PT/INR - ( 04 Nov 2021 18:40 )   PT: 14.8 sec;   INR: 1.24 ratio         PTT - ( 04 Nov 2021 18:40 )  PTT:60.4 sec      < from: CT Head No Cont (11.04.21 @ 19:48) >  IMPRESSION:  BRAIN:  Diffuse gray-white matter indistinctness strongly suspicious for cerebral edema possibly secondary to hypoxic ischemic encephalopathy. Clinical correlation will determine the need for brain MR for further evaluation.    No intracranial hemorrhage.    CERVICAL SPINE:  No fracture or traumatic subluxation.    The endotracheal tube tip is at the level of the first rib.    < end of copied text >

## 2021-11-04 NOTE — ED PROVIDER NOTE - ATTENDING CONTRIBUTION TO CARE
42 yo male with reported history of ETOH abuse presenting from home in cardiac arrest.  Per EMS report the patient went to the bathroom and had either had a seizure and fallen or fallen and had a seizure.  Was found in arrest.  They performed CPR gave 5 epi and 2 bicarb and got ROSC.  On arrival here in the ED the patient arrived intubated and unresponsive.  Further history was unable to be obtained due to his medical condition    Gen: Ill appearing unresponsive intubated  Head: NC   ENT:  There is some dried blood in the nares  Eyes: Pupils are fixed and dilated  Neck: trachea midline  Resp:  intubated clear breath sounds not breathing over the vent   Ext: no deformities  Neuro:  unresponsive no response to any stimuli   Skin:  Cold and mottled - question dependent lividity      42 yo presenting after cardiac arrest.  During his initial ED course he went oli two times and each time arrested.  With CPR and epi was able to get ROSC.  At that point was started on a levophed gtt.  Unsure of whether or not the patient had a seizure first or had a fall first and then a seizure.  Pts significant other of 15 years came to the ED where she stated he had taken some blue pills (for IBS) that he got from a coworker.  Was tired yesterday and today he went on the bathroom and she fell asleep.  States her daughter woke her up and said he was banging against the door  She found him seizing 40 yo male with reported history of ETOH abuse presenting from home in cardiac arrest.  Per EMS report the patient went to the bathroom and had either had a seizure and fallen or fallen and had a seizure.  Was found in arrest.  They performed CPR gave 5 epi and 2 bicarb and got ROSC.  On arrival here in the ED the patient arrived intubated and unresponsive.  Further history was unable to be obtained due to his medical condition    Gen: Ill appearing unresponsive intubated  Head: NC   ENT:  There is some dried blood in the nares  Eyes: Pupils are fixed and dilated  Neck: trachea midline  Resp:  intubated clear breath sounds not breathing over the vent   Ext: no deformities  Neuro:  unresponsive no response to any stimuli   Skin:  Cold and mottled - question dependent lividity      40 yo presenting after cardiac arrest.  During his initial ED course he went oli two times and each time arrested.  With CPR and epi was able to get ROSC.  At that point was started on a levophed gtt.  Unsure of whether or not the patient had a seizure first or had a fall first and then a seizure.  Pts significant other of 15 years came to the ED where she stated he had taken some blue pills (for IBS) that he got from a coworker.  Was tired yesterday and today he went on the bathroom and she fell asleep.  States her daughter woke her up and said he was banging against the door  She found him seizing and then he stopped and sounded like he was doing some post ictal breathing.  Then stopped breathing.  EMS was called at some point after.  Discussion had with girlfriend as well as oldest sister (those two do not get along) as to GOC and who is POA.  GF deferred to oldest sister.  Decision was to keep him full code until entire family could be here.  Radiology called after head CT to look for bleed in the setting of Sz and fall and showed global hypoxic encephalopathy with loss of gray white differentiation.  Decision was made to cool patient based on age when with the poor prognosis.    Family is relaying that he reportedly fell from toilet at 1700 and did not arrive in ED til 1845.  EMS stating code time of 45 minutes which would mean there was an extended downtime prior.  pH was less than 6.9 and bicarb undetectable.  Given amps of bicarb and started on gtt.  Levo maintained pressures.  Femoral TLC placed.   called for family.  Will admit to ICU here at  at this time.

## 2021-11-04 NOTE — H&P ADULT - ASSESSMENT
41 yr male hd of etoh abuse, ? drug abuse. now cardiac arrest , ROSC in the field post ACLS.     Cardiac Arrest   ? Anoxic brain injury     Hypothermic protocol  pressor support   sedation with prop   correct lytes  serial labs  Neuro eval   cardiology eval , ECHO ordered   Repeat CT scan  head to evaluate    GI / DVT prophylaxis.   keep K>4, mag >2.0   D/W with EICU team and bedside team

## 2021-11-04 NOTE — ED PROVIDER NOTE - CLINICAL SUMMARY MEDICAL DECISION MAKING FREE TEXT BOX
41 yr old male with hx of ETOH and drug abuse presents s/p cardiac arrest. Girlfriend report pt was lethargic today, and her daughter heard pt hitting on bathroom door. pt was sitting on toliet shaking when bathroom door opened, and then fell off, hitting head on bathtub. EMS called, and pt had no pulse, in field, pt was intubated, and given 4 rounds of epi, calcium and bicarb, and ROSC obtained.    in ED, pt lost pulse 2 times, given epi both times and ROSC Obtained   EICU called

## 2021-11-04 NOTE — ED ADULT NURSE REASSESSMENT NOTE - NS ED NURSE REASSESS COMMENT FT1
Targeted temp protocol initiated at 817p.  Pt family allowed to visit,  provided to give last rights.  Pt stable on levophed, propofol.  Report given to ICU and pt transferred safely.

## 2021-11-04 NOTE — ED ADULT NURSE NOTE - OBJECTIVE STATEMENT
Pt BIB EMS after 25 minute cardiac arrest in ROSC.  Pt was intubated and receiving 100% O2 via ambu bag.  Pt has IO right tibia.  Pt did have VS on arrival.

## 2021-11-04 NOTE — ED PROVIDER NOTE - OBJECTIVE STATEMENT
41 yr old male presents s/p cardiac arrest. Pt was found by girlfriend, who found pt in the bathroom unresponsive. 41 yr old male with hx of ETOH and drug abuse presents s/p cardiac arrest. Girlfriend report pt was lethargic today, and her daughter heard pt hitting on bathroom door. pt was sitting on toliet shaking when bathroom door opened, and then fell off, hitting head on bathtub. EMS called, and pt had no pulse, in field, pt was intubated, and given 4 rounds of epi, calcium and bicarb, and ROSC obtained. 41 yr old male with hx of ETOH and drug abuse presents s/p cardiac arrest. Girlfriend report pt was lethargic today and her daughter heard pt hitting on bathroom door. pt was sitting on toilet, shaking when bathroom door opened, and then fell off, hitting head on bathtub. EMS called, and pt had no pulse, in field, pt was intubated, and given 4 rounds of epi, calcium and bicarb, and ROSC obtained.

## 2021-11-04 NOTE — H&P ADULT - NSHPLABSRESULTS_GEN_ALL_CORE
artificial  tears Solution 1 Drop(s) Both EYES every 12 hours  chlorhexidine 0.12% Liquid 15 milliLiter(s) Oral Mucosa every 12 hours  chlorhexidine 4% Liquid 1 Application(s) Topical <User Schedule>  dextrose 40% Gel 15 Gram(s) Oral once  dextrose 5%. 1000 milliLiter(s) IV Continuous <Continuous>  dextrose 50% Injectable 25 Gram(s) IV Push once  enoxaparin Injectable 40 milliGRAM(s) SubCutaneous daily  glucagon  Injectable 1 milliGRAM(s) IntraMuscular once  meperidine     Injectable 12.5 milliGRAM(s) IV Push every 2 hours PRN  norepinephrine Infusion 0.05 MICROgram(s)/kG/Min IV Continuous <Continuous>  pantoprazole  Injectable 40 milliGRAM(s) IV Push two times a day  propofol Infusion 10 MICROgram(s)/kG/Min IV Continuous <Continuous>  sodium bicarbonate  Infusion 0.183 mEq/kG/Hr IV Continuous <Continuous>                            12.4   13.49 )-----------( 57       ( 04 Nov 2021 18:40 )             41.0       Hemoglobin: 12.4 g/dL (11-04 @ 18:40)      11-04    140  |  98  |  18  ----------------------------<  234<H>  4.3   |  11<L>  |  2.31<H>    Ca    10.5      04 Nov 2021 18:40    TPro  7.4  /  Alb  3.1<L>  /  TBili  1.6<H>  /  DBili  x   /  AST  228<H>  /  ALT  68<H>  /  AlkPhos  116  11-04    Creatinine Trend: 2.31<--    COAGS: PT/INR - ( 04 Nov 2021 18:40 )   PT: 14.8 sec;   INR: 1.24 ratio         PTT - ( 04 Nov 2021 18:40 )  PTT:60.4 sec          T(C): 36.6 (11-04-21 @ 19:58), Max: 36.6 (11-04-21 @ 19:58)  HR: 147 (11-04-21 @ 22:30) (54 - 147)  BP: 172/107 (11-04-21 @ 21:51) (69/52 - 172/107)  RR: 27 (11-04-21 @ 21:51) (19 - 27)  SpO2: 100% (11-04-21 @ 22:30) (93% - 100%)  Wt(kg): --    I&O's Summary      c< from: CT Head No Cont (11.04.21 @ 19:48) >    IMPRESSION:  BRAIN:  Diffuse gray-white matter indistinctness strongly suspicious for cerebral edema possibly secondary to hypoxic ischemic encephalopathy. Clinical correlation will determine the need for brain MR for further evaluation.    No intracranial hemorrhage.    CERVICAL SPINE:  No fracture or traumatic subluxation.    The endotracheal tube tip is at the level of the first rib.    Findings were discussed with Physician: DAKSHA CASATNO  on 11/4/2021 8:13 PM with read back.    --- End of Report ---              JUSTINA RUBIO MD; Attending Radiologist  This document has been electronically signed. Nov 4 2021  8:17PM    < end of copied text >      ECG:  , NAD , no acute ischemia artificial  tears Solution 1 Drop(s) Both EYES every 12 hours  chlorhexidine 0.12% Liquid 15 milliLiter(s) Oral Mucosa every 12 hours  chlorhexidine 4% Liquid 1 Application(s) Topical <User Schedule>  dextrose 40% Gel 15 Gram(s) Oral once  dextrose 5%. 1000 milliLiter(s) IV Continuous <Continuous>  dextrose 50% Injectable 25 Gram(s) IV Push once  enoxaparin Injectable 40 milliGRAM(s) SubCutaneous daily  glucagon  Injectable 1 milliGRAM(s) IntraMuscular once  meperidine     Injectable 12.5 milliGRAM(s) IV Push every 2 hours PRN  norepinephrine Infusion 0.05 MICROgram(s)/kG/Min IV Continuous <Continuous>  pantoprazole  Injectable 40 milliGRAM(s) IV Push two times a day  propofol Infusion 10 MICROgram(s)/kG/Min IV Continuous <Continuous>  sodium bicarbonate  Infusion 0.183 mEq/kG/Hr IV Continuous <Continuous>                            12.4   13.49 )-----------( 57       ( 04 Nov 2021 18:40 )             41.0       Hemoglobin: 12.4 g/dL (11-04 @ 18:40)      11-04    140  |  98  |  18  ----------------------------<  234<H>  4.3   |  11<L>  |  2.31<H>    Ca    10.5      04 Nov 2021 18:40    TPro  7.4  /  Alb  3.1<L>  /  TBili  1.6<H>  /  DBili  x   /  AST  228<H>  /  ALT  68<H>  /  AlkPhos  116  11-04    Creatinine Trend: 2.31<--    COAGS: PT/INR - ( 04 Nov 2021 18:40 )   PT: 14.8 sec;   INR: 1.24 ratio         PTT - ( 04 Nov 2021 18:40 )  PTT:60.4 sec          T(C): 36.6 (11-04-21 @ 19:58), Max: 36.6 (11-04-21 @ 19:58)  HR: 147 (11-04-21 @ 22:30) (54 - 147)  BP: 172/107 (11-04-21 @ 21:51) (69/52 - 172/107)  RR: 27 (11-04-21 @ 21:51) (19 - 27)  SpO2: 100% (11-04-21 @ 22:30) (93% - 100%)  Wt(kg): --    I&O's Summary      c< from: CT Head No Cont (11.04.21 @ 19:48) >    IMPRESSION:  BRAIN:  Diffuse gray-white matter indistinctness strongly suspicious for cerebral edema possibly secondary to hypoxic ischemic encephalopathy. Clinical correlation will determine the need for brain MR for further evaluation.    No intracranial hemorrhage.    CERVICAL SPINE:  No fracture or traumatic subluxation.    The endotracheal tube tip is at the level of the first rib.    Findings were discussed with Physician: DAKSHA CASTANO  on 11/4/2021 8:13 PM with read back.    --- End of Report ---              JUSTINA RUBIO MD; Attending Radiologist  This document has been electronically signed. Nov 4 2021  8:17PM    < end of copied text >      ECG:  , NAD , LBBB,

## 2021-11-04 NOTE — PROVIDER CONTACT NOTE (EICU) - RECOMMENDATIONS
as soon as stable for transfer, would recommend transfer to higher level of care for cardiac workup in this young gentleman who had cardiac arrest of unclear etiology (provided CT head negative for intracranial pathology, in which case transfer for neurosx) as soon as stable for transfer, would recommend transfer to higher level of care for cardiac workup in this young gentleman who had cardiac arrest of unclear etiology (provided CT head negative for intracranial pathology, in which case transfer for neurosx)  Discussed with ED provider Vivian and MALU Murphy

## 2021-11-04 NOTE — PROVIDER CONTACT NOTE (EICU) - ASSESSMENT
42 yo M with unclear etiology of cardiac arrest   -follow up labs and imaging   -CC NP to see patient and assist in stabilization

## 2021-11-05 NOTE — DIETITIAN INITIAL EVALUATION ADULT. - OTHER INFO
41 yr old male with hx of ETOH and drug abuse presents s/p cardiac arrest. Patient intubated ,sedated with propofol undergoing targeted temperature management.    insulin infusion noted . No edema , No BM Skin ecchymotic . 41 yr old male with hx of ETOH and drug abuse presents s/p cardiac arrest. Patient intubated ,sedated with propofol undergoing targeted temperature management.    insulin infusion noted . No edema , No BM, Skin ecchymotic .

## 2021-11-05 NOTE — CONSULT NOTE ADULT - ASSESSMENT
s/p cardiac arrest, unclear etiology  elevated Tn, possible ischemic event as etiology ( MI) or elevated due to prolonged CPR  echo with mild lvh and mild lv dysfunction....possible myopathy, or due to post arrest status      suggest  repeat ekg  supportive care for now  further cardiac work up depends on neurologic recovery  poor Px  d/w dr mcneal

## 2021-11-05 NOTE — CONSULT NOTE ADULT - SUBJECTIVE AND OBJECTIVE BOX
CHIEF COMPLAINT:  Patient is a 41y old  Male who presents with a chief complaint of cardiac arrest (2021 09:24)    HPI:   41 yr old male with hx of ETOH and drug abuse presents s/p cardiac arrest. Girlfriend report pt was lethargic today, and her daughter heard pt hitting on bathroom door. pt was sitting on toilet shaking when bathroom door opened, and then fell off, hitting head on bathtub. EMS called, and pt had no pulse, in field, pt was intubated, and given 4 rounds of epi, calcium and bicarb, and ROSC obtained.    pt admitted to ICU , ECG reviewd, pt on full vent support, pressor support . CT scan done with acute finding. Pt started on Hypothermic protocol in ER at 2017.   pt admitted to ICU 2250.  (2021 23:00)    D/w Dr. Sánchez, notes reviewed. Patient intubated unable to provide history.      PMH:   No pertinent past medical history        PSH:   No significant past surgical history          FAMILY HISTORY:      SOCIAL HISTORY:  Smoking:  Alcohol:  Drugs:    ALLERGIES:  penicillin (Unknown)      Home Medications:      MEDICATIONS:  artificial  tears Solution 1 Drop(s) Both EYES every 12 hours  chlorhexidine 0.12% Liquid 15 milliLiter(s) Oral Mucosa every 12 hours  chlorhexidine 4% Liquid 1 Application(s) Topical <User Schedule>  dextrose 5%. 1000 milliLiter(s) IV Continuous <Continuous>  dextrose 50% Injectable 25 Gram(s) IV Push once  insulin regular Infusion 4 Unit(s)/Hr IV Continuous <Continuous>  meperidine     Injectable 12.5 milliGRAM(s) IV Push every 2 hours PRN  norepinephrine Infusion 0.05 MICROgram(s)/kG/Min IV Continuous <Continuous>  pantoprazole  Injectable 40 milliGRAM(s) IV Push two times a day  propofol Infusion 10 MICROgram(s)/kG/Min IV Continuous <Continuous>  sodium chloride 0.9% lock flush 10 milliLiter(s) IV Push every 1 hour PRN      REVIEW OF SYSTEMS: not obtainable      PHYSICAL EXAM:  T(C): 32.2 (21 @ 11:00), Max: 37 (21 @ 22:30)  HR: 72 (21 @ 11:42) (54 - 147)  BP: 109/77 (11-05-21 @ 11:00) (69/52 - 172/107)  RR: 20 (21 @ 11:15) (16 - 27)  SpO2: 97% (21 @ 11:42) (93% - 100%)  Wt(kg): --    GENERAL: NAD, well-groomed, well-developed intubated  HEAD:  Atraumatic, Normocephalic  EYES: fixed pupils  NECK:  No JVD, no bruits  CHEST/LUNG: Clear to ausculation and percussion bilaterally; No rales, rhonchi, wheezing, or rubs  HEART: Regular rate and rhythm; No murmurs, rubs, or gallops PMI non displaced.  ABDOMEN: Soft, Nontender, Nondistended; Bowel sounds present  EXTREMITIES:   No clubbing, cyanosis, or edema  NERVOUS SYSTEM:  unresponsive, no elicited reflexes    Cardiovascular Diagnostic Testing:  ECG:  < from: 12 Lead ECG (21 @ 18:59) >    Ventricular Rate 136 BPM    Atrial Rate 66 BPM    QRS Duration 90 ms    Q-T Interval 384 ms    QTC Calculation(Bazett) 577 ms    R Axis 23 degrees    T Axis 70 degrees    Diagnosis Line Sinus tachycardia  Abnormal ECG  When compared with ECG of 2021 18:43,  Nonspecific T wave abnormality no longer evident in Inferior leads  T wave inversion less evident in Anterolateral leads  Confirmed by ABA CONWAY, LEÓN RICHARDSON () on 2021 8:44:11 AM    < end of copied text >      LABS:                        12.9   15.34 )-----------( 40       ( 2021 09:15 )             38.2         144  |  106  |  25<H>  ----------------------------<  188<H>  3.2<L>   |  29  |  1.98<H>    Ca    8.3<L>      2021 09:15  Phos  4.2       Mg     2.4         TPro  7.4  /  Alb  3.1<L>  /  TBili  1.6<H>  /  DBili  x   /  AST  228<H>  /  ALT  68<H>  /  AlkPhos  116      PT/INR - ( 2021 11:05 )   PT: 17.1 sec;   INR: 1.44 ratio         PTT - ( 2021 11:05 )  PTT:40.7 sec  CARDIAC MARKERS ( 2021 22:55 )  tn 108, 936      Serum Pro-Brain Natriuretic Peptide: 3119 pg/mL ( @ 21:02)    Telemetry:  sinus    IMAGING:    < from: CT Head No Cont (21 @ 19:48) >    EXAM:  CT CERVICAL SPINE    EXAM:  CT BRAIN      PROCEDURE DATE:  2021        INTERPRETATION:  INDICATIONS:  Question fall with fracture    CT BRAIN:  TECHNIQUE:  Serial axial images were obtained from the skull base to the vertex without intravenous contrast. Sagittal and coronal reformatted images were obtained.    COMPARISON EXAMINATION: None.    FINDINGS:  VENTRICLES AND SULCI:  Normal.  INTRA-AXIAL:  There is diffuse gray-white matter indistinctness involving the cortex and central gray. Mild diffuse sulcal effacement is seen. No midline shift is seen. The basal cisterns are patent.  EXTRA-AXIAL:  No mass or collection is seen.  VISUALIZED SINUSES:  Mild mucosal thickening  VISUALIZED MASTOIDS:  Clear.  CALVARIUM:  Normal.  MISCELLANEOUS:  None.      CT CERVICAL SPINE:  TECHNIQUE:  Axial images were obtained using multislice helical technique.  Reformatted coronal and sagittal images were performed.    COMPARISON EXAMINATION:  None.    FINDINGS:  VERTEBRAL BODIES:  Normal in height. No fracture.  ALIGNMENT:  No subluxations. Mild reversal of the cervical lordosis  INTERVERTEBRAL DISC SPACES: Normal.  MISCELLANEOUS:  An ET tube is in place with the tip just below the first rib. Atelectatic changes versus consolidation is seen within the upper lobes.    IMPRESSION:  BRAIN:  Diffuse gray-white matter indistinctness strongly suspicious for cerebral edema possibly secondary to hypoxic ischemic encephalopathy. Clinical correlation will determine the need for brain MR for further evaluation.    No intracranial hemorrhage.    CERVICAL SPINE:  No fracture or traumatic subluxation.    The endotracheal tube tip is at the level of the first rib.    Findings were discussed with Physician: DAKSHA CASTANO  on 2021 8:13 PM with read back.    --- End of Report ---    < end of copied text >    < from: TTE Echo Complete w/o Contrast w/ Doppler (21 @ 09:21) >    EXAM:  ECHO TTE WO CON COMP W DOPP      PROCEDURE DATE:  2021        INTERPRETATION:  TRANSTHORACIC ECHOCARDIOGRAM REPORT        Patient Name:   AYALA PERDUE Patient Location: [address] Rec #:  [id number]           Accession #:      [id number]  Account #:      [id number]            Height:           73.0 in 185.4 cm  YOB: 1980           Weight:           271.2 lb 123.00 kg  Patient Age:    41 years           BSA:              2.45 m²  Patient Gender: M                  BP:               172/115 mmHg      Date of Exam:        2021 9:21:07 AM  Sonographer:         Navjot Stapleton  Referring Physician: TUCKER SKELTON    Procedure:     2D Echo/Doppler/Color Doppler Complete.  Indications:   Shock, unspecified - R57.9  Diagnosis:     Cardiac arrest, cause unspecified - I46.9  Study Details: Technically fair study. Study quality was adversely affected due                 to patient obesity and the patient being intubated.        2D AND M-MODE MEASUREMENTS (normal ranges within parentheses):  Left                 Normal   Aorta/Left            Normal  Ventricle:                    Atrium:  IVSd (2D):    1.29  (0.7-1.1) Aortic Root   2.99  (2.4-3.7)                 cm             (2D):          cm  LVPWd (2D):   1.30  (0.7-1.1) Aortic Root   3.07  (2.4-3.7)                 cm             (Mmode):       cm  LVIDd (2D):   5.05  (3.4-5.7) Left Atrium   4.36  (1.9-4.0)                 cm             (2D):          cm  LVIDs (2D):   4.00            Left Atrium   3.62  (1.9-4.0)                 cm             (Mmode):       cm  LV FS (2D):   20.8   (>25%)   LA Volume     27.2                  %             Index        ml/m²  LV EF (2D):   42 %   (>55%)   Right Ventricle:  Relative Wall 0.51   (<0.42)  TAPSE:       1.85 cm  Thickness    LV SYSTOLIC FUNCTION BY 2D PLANIMETRY (MOD):  EF-A4C View: 44.8 % EF-A2C View: 47.7 % EF-Biplane: 46 %    LV DIASTOLIC FUNCTION:  MV Peak E: 0.66 m/s  MV Peak A: 0.41 m/s  E/A Ratio: 1.62    SPECTRAL DOPPLER ANALYSIS (where applicable):  Aortic Valve: AoV Max Robert: 1.09 m/s AoV Peak P.7 mmHg AoV Mean P.8 mmHg    LVOT Vmax: 0.83 m/s LVOT VTI: 0.184 m LVOT Diameter: 2.37 cm    AoV Area, Vmax: 3.37 cm² AoV Area, VTI: 3.28 cm² AoV Area, Vmn: 2.54 cm²  Ao VTI: 0.247  Tricuspid Valve and PA/RV Systolic Pressure: TR Max Velocity: 2.04 m/s RA Pressure: 10 mmHg RVSP/PASP: 26.6 mmHg      PHYSICIAN INTERPRETATION:  Left Ventricle: The left ventricular internal cavity size is normal. Left ventricular wall thickness is increased. There is mild concentric left ventricular hypertrophy involving the global wall. The LVH involves global walls.  Global LV systolic function was mildly decreased. Left ventricular ejection fraction, by visual estimation, is 45 to 50%. Spectral Doppler shows normal pattern of LV diastolic filling.  Right Ventricle: Normal right ventricular size and function.  Left Atrium: The left atrium is normal in size.  Right Atrium: The right atrium is normal in size.  Pericardium: There is no evidence of pericardial effusion.  Mitral Valve: Structurally normal mitral valve, with normal leaflet excursion. Trace mitral valve regurgitation is seen.  Tricuspid Valve: The tricuspid valve is normal in structure. Mild tricuspid regurgitation is visualized.  Aortic Valve: Normal trileaflet aortic valve with normal opening. Peak transaortic gradient equals 4.7 mmHg, mean transaortic gradient equals 2.8 mmHg, the calculated aortic valve area equals 3.28 cm² by the continuity equation consistent with normally opening aortic valve. No evidence of aortic valve regurgitation is seen.  Pulmonic Valve: Structurally normal pulmonic valve, with normal leaflet excursion. Trace pulmonic valve regurgitation.  Aorta: The aortic root and ascending aorta are structurally normal, with no evidence of dilitation. The aortic arch was not well visualized. Aortic root measured at Sinus of Valsalva is normal.  Pulmonary Artery: The pulmonary artery is of normal size and origin.  Venous: The inferior vena cava is not well visualized.      Summary:   1. Left ventricular ejection fraction, by visual estimation, is 45 to 50%.   2. Mildly decreased global left ventricular systolic function.   3. Increased LV wall thickness.   4. Normal left ventricular internal cavity size.   5. There is mild concentric left ventricular hypertrophy.   6. Trace mitral valve regurgitation.   7. Mild tricuspid regurgitation.    [id number] León Bullock MD,Formerly [facility] , Electronically signed on 2021 at 12:21:51 PM    < end of copied text >

## 2021-11-05 NOTE — DIETITIAN INITIAL EVALUATION ADULT. - PERTINENT LABORATORY DATA
Date: 05 Nov 2021 12:50  Hemoglobin 13.2   Hematocrit 39.4     Date: 11-05  Sodium 144  Potassium 3.2<L>  Glucose Serum 188<H>  BUN 25<H>  Creatinine 2.05    ACCUCHECK  POCT Blood Glucose.: 88 mg/dL (05 Nov 2021 12:34)  POCT Blood Glucose.: 106 mg/dL (05 Nov 2021 11:39)  POCT Blood Glucose.: 138 mg/dL (05 Nov 2021 10:33)  POCT Blood Glucose.: 187 mg/dL (05 Nov 2021 09:31)

## 2021-11-05 NOTE — PROGRESS NOTE ADULT - SUBJECTIVE AND OBJECTIVE BOX
Follow-up Critical Care Progress Note  Chief Complaint : Cardiac arrest    Admitted overnight with cardiac arrest, now patient is mechanically ventilated, sedated with propofol undergoing targeted temperature management.     Allergies :penicillin (Unknown)      PAST MEDICAL & SURGICAL HISTORY:  No pertinent past medical history    No significant past surgical history    FAMILY HISTORY: Unknown    SOCIAL HISTORY: + Alcohol abuse history    Medications:  MEDICATIONS  (STANDING):  artificial  tears Solution 1 Drop(s) Both EYES every 12 hours  chlorhexidine 0.12% Liquid 15 milliLiter(s) Oral Mucosa every 12 hours  chlorhexidine 4% Liquid 1 Application(s) Topical <User Schedule>  dextrose 5%. 1000 milliLiter(s) (50 mL/Hr) IV Continuous <Continuous>  dextrose 50% Injectable 25 Gram(s) IV Push once  insulin regular Infusion 4 Unit(s)/Hr (4 mL/Hr) IV Continuous <Continuous>  pantoprazole  Injectable 40 milliGRAM(s) IV Push two times a day  propofol Infusion 10 MICROgram(s)/kG/Min (7.38 mL/Hr) IV Continuous <Continuous>  sodium bicarbonate  Infusion 0.183 mEq/kG/Hr (150 mL/Hr) IV Continuous <Continuous>    MEDICATIONS  (PRN):  meperidine     Injectable 12.5 milliGRAM(s) IV Push every 2 hours PRN Shivering  sodium chloride 0.9% lock flush 10 milliLiter(s) IV Push every 1 hour PRN Pre/post blood products, medications, blood draw, and to maintain line patency    LABS:                      12.9   x     )-----------( 40       ( 2021 09:15 )             38.2     11  141  |  104  |  25<H>  ----------------------------<  229<H>  3.5   |  26  |  2.05<H>    Ca    8.3<L>      2021 05:00  Phos  4.2       Mg     2.4         TPro  7.4  /  Alb  3.1<L>  /  TBili  1.6<H>  /  DBili  x   /  AST  228<H>  /  ALT  68<H>  /  AlkPhos  116  11-04    CARDIAC MARKERS ( 2021 22:55 )  x     / x     / 8801 U/L / x     / 10.1 ng/mL    PT/INR - ( 2021 23:00 )   PT: 15.3 sec;   INR: 1.28 ratio       PTT - ( 2021 23:00 )  PTT:37.8 sec  Urinalysis Basic - ( 2021 20:00 )    Color: Yellow / Appearance: Slightly Turbid / S.010 / pH: x  Gluc: x / Ketone: Negative  / Bili: Negative / Urobili: Negative   Blood: x / Protein: 500 mg/dL / Nitrite: Negative   Leuk Esterase: Negative / RBC: 26-50 /HPF / WBC 3-5 /HPF   Sq Epi: x / Non Sq Epi: Neg.-Few / Bacteria: Moderate /HPF    Serum Pro-Brain Natriuretic Peptide: 3119 pg/mL (21 @ 21:02)    ABG - ( 2021 05:00 )  pH, Arterial: 7.45  pH, Blood: x     /  pCO2: 37    /  pO2: 134   / HCO3: 26    / Base Excess: 1.7   /  SaO2: 99.6      VITALS:  T(C): 31.8 (21 @ 09:00), Max: 37 (21 @ 22:30)  T(F): 89.2 (21 @ 09:00), Max: 98.6 (21 @ 22:30)  HR: 68 (21 09:00) (54 - 147)  BP: 126/84 (21 @ 23:00) (69/52 - 172/107)  BP(mean): 98 (21 23:00) (70 - 131)  ABP: 100/59 (21 @ 09:00) (100/59 - 136/81)  ABP(mean): 74 (21 @ 09:00) (74 - 99)  RR: 20 (21 09:00) (16 - 27)  SpO2: 96% (21 09:00) (93% - 100%)  CVP(mm Hg): --  CVP(cm H2O): --    Ins and Outs     21 @ 07:01  -  21 @ 07:00  --------------------------------------------------------  IN: 1382.9 mL / OUT: 1455 mL / NET: -72.1 mL    21 @ 07:01  -  21 @ 09:25  --------------------------------------------------------  IN: 302.2 mL / OUT: 300 mL / NET: 2.2 mL    Height (cm): 185.4 (21 @ 18:50)  Weight (kg): 123 (21 @ 19:09)  BMI (kg/m2): 35.8 (21 @ 19:09)    Device: Avea, Mode: AC/ CMV (Assist Control/ Continuous Mandatory Ventilation), RR (machine): 20, RR (patient): 20, TV (machine): 450, TV (patient): 300, FiO2: 50, PEEP: 5, ITime: 0.7, MAP: 9, PIP: 23    I&O's Detail    2021 07:  -  2021 07:00  --------------------------------------------------------  IN:    Insulin: 8 mL    IV PiggyBack: 300 mL    IV PiggyBack: 100 mL    Norepinephrine: 23 mL    Propofol: 51.9 mL    Sodium Bicarbonate: 900 mL  Total IN: 1382.9 mL    OUT:    Voided (mL): 1455 mL  Total OUT: 1455 mL    Total NET: -72.1 mL      2021 07:01  -  2021 09:25  --------------------------------------------------------  IN:    IV PiggyBack: 200 mL    Propofol: 2.2 mL    Sodium Bicarbonate: 100 mL  Total IN: 302.2 mL    OUT:    Voided (mL): 300 mL  Total OUT: 300 mL    Total NET: 2.2 mL

## 2021-11-05 NOTE — PROGRESS NOTE ADULT - ASSESSMENT
PHYSICAL EXAM:  Constitutional: Sedated  Eyes: Pupils fixed and dilated, absent corneal  ENMT: Orally intubated  Neck: Supple, no masses  Respiratory: Diminished breath sounds on the left  Cardiovascular: S1 S2 no murmurs   Gastrointestinal: Distended but soft, no palpable masses   Genitourinary: David in place   Extremities: Cool to touch, no clubbing  Neurological: Pupils fixed and dilated, no corneal reflexes, no cough to ET suctioning, no withdrawal to painful stimulation, no myoclonus, though exam is limited as being on propofol  Skin: No obvious cyanosis   Musculoskeletal: No digital clubbing, no lower extremity edema   Psychiatric: Unable to evaluate    Assessment:  42 y/o male with history of Alcohol dependence, admitted to the ICU s/p Cardiac arrest with ROSC achieved at 25 minutes. Currently patient is mechanically ventilated, sedated with propofol undergoing targeted temperature management per protocol. CT scan of the head is concerning for anoxic brain injury. Clinical exam with absent brain stem reflexes though currently patient is sedated with propofol.     1. Cardiac arrest  2. Acute respiratory failure  3. Lactic acidosis   4. Acute renal failure   5. Thrombocytopenia   6. Hypokalemia   7. Elevated troponin   8. Hyperglycemia  9. Seizure     Plan  - Cont mechanical ventilation  - Hemodynamic monitoring and support with vasopressors to maintain MAP > 65  - Monitor urine output and renal function  - Seizure precautions  - Close neurologic monitoring  - Echo and cardiology evaluation   - Blood work per protocol for hypothermia   - Insulin infusion for uncontrolled hyperglycemia, titrate per protocol  - Keep propofol for now  - D/c sodium bicarb as serum bicarb improving  - Cont. central line, arterial line and david catheter for now  - Organ donation referral.   - Received a call from patient's sister Ale Dodd, [phone]), who is a dialysis nurse - states patient was sick yesterday all day, "just not feeling well". He supposedly took some unidentified pills from his coworker earlier yesterday. Then in the afternoon he went to the bathroom. The gf heard a crash in the bathroom, and by the time they were able to break into the bathroom, they found him seizing in the bathtub. EMS came about 10 - 15 min later and took about 25 min to achieve ROSC. So realistically she feels patient was with out a pulse for about 50 minutes.

## 2021-11-05 NOTE — PROGRESS NOTE ADULT - CONVERSATION DETAILS
Patient is not , no children over 18, no older parents. At this time the family and girl friend have identified the sister Ale as surrogate for now.  I explained to the sister the concern for neurologic injury, including possibility of brain death. But at this time unable to make a determination regarding neurologic function as patient is currently on propofol. Will need to be off sedation for at least 48 hours and with out seizures before a determination can be made regarding neurologic function.  She is considering a DNR order but will decide when she visits later today. Wants to continue current level of care currently.

## 2021-11-05 NOTE — DIETITIAN INITIAL EVALUATION ADULT. - PERTINENT MEDS FT
MEDICATIONS  (STANDING):  artificial  tears Solution 1 Drop(s) Both EYES every 12 hours  chlorhexidine 0.12% Liquid 15 milliLiter(s) Oral Mucosa every 12 hours  chlorhexidine 4% Liquid 1 Application(s) Topical <User Schedule>  dextrose 5%. 1000 milliLiter(s) (50 mL/Hr) IV Continuous <Continuous>  dextrose 50% Injectable 25 Gram(s) IV Push once  norepinephrine Infusion 0.05 MICROgram(s)/kG/Min (5.77 mL/Hr) IV Continuous <Continuous>  pantoprazole  Injectable 40 milliGRAM(s) IV Push two times a day  propofol Infusion 10 MICROgram(s)/kG/Min (7.38 mL/Hr) IV Continuous <Continuous>    MEDICATIONS  (PRN):  meperidine     Injectable 12.5 milliGRAM(s) IV Push every 2 hours PRN Shivering  sodium chloride 0.9% lock flush 10 milliLiter(s) IV Push every 1 hour PRN Pre/post blood products, medications, blood draw, and to maintain line patency

## 2021-11-05 NOTE — PROVIDER CONTACT NOTE (CRITICAL VALUE NOTIFICATION) - ASSESSMENT
unresponsive, intubated, sedated, on levophed, noted no gag reflex, no corneal reflexes, pupils are fixed and unresponsive.

## 2021-11-06 NOTE — PROGRESS NOTE ADULT - ASSESSMENT
PHYSICAL EXAM:  Constitutional: Off sedation   Eyes: Pupils fixed and dilated, absent corneal  ENMT: Orally intubated  Neck: Supple, no masses  Respiratory: Bilateral air entry, no wheezing or ronchi noted  Cardiovascular: S1 S2 no murmurs   Gastrointestinal: Distended but soft, no palpable masses   Genitourinary: David in place   Extremities: Cool to touch, no clubbing  Neurological: Pupils fixed and dilated, no corneal reflexes, no cough to ET suctioning, no withdrawal to painful stimulation, no myoclonus, Triggers vent when placed on CPAP mode, no withdrawal to nailbed pressure  Skin: No obvious cyanosis   Musculoskeletal: No digital clubbing, no lower extremity edema   Psychiatric: Unable to evaluate    Assessment:  40 y/o male with history of Alcohol dependence, admitted to the ICU s/p Cardiac arrest with ROSC achieved at 25 minutes. Currently patient is mechanically ventilated, sedated with propofol undergoing targeted temperature management per protocol. CT scan of the head is concerning for anoxic brain injury. Clinical exam with minimal brain stem reflexes as triggers vent when placed on PS mode.     1. Cardiac arrest  2. Acute respiratory failure  3. Lactic acidosis   4. Acute renal failure   5. Thrombocytopenia   6. Hypokalemia   7. Elevated troponin   8. Hyperglycemia  9. Seizure     Plan  - Cont mechanical ventilation with lung protective strategy, worsening hypoxia and respiratory exchange   - Hemodynamic monitoring and support with vasopressors to maintain MAP > 65, switch to phenylephrine   - Monitor urine output and renal function  - Seizure precautions  - Obtain EEG to evaluate further   - Close neurologic monitoring  - Echo and cardiology evaluation appreciated, mildly depressed EF  - Blood work per protocol for hypothermia   - Lactate normalizing  - Off insulin infusion as patient is hypoglycemic now  - Keep off propofol for now  - Cont. central line, arterial line and david catheter for now  - Organ donation referral.   - Place NGT and start tube feeds   - Non chemical DVT prophylaxis given thrombocytopenia  - DNR code status  - Sister Ale updated by PA today, family to come in today

## 2021-11-06 NOTE — PROGRESS NOTE ADULT - SUBJECTIVE AND OBJECTIVE BOX
Follow-up Critical Care Progress Note  Chief Complaint : Cardiac arrest    Worsening shock this morning with high dose of vasopressors. Being rewarmed at this time. Remains not arousable, though triggers the vent.    Allergies :penicillin (Unknown)    PAST MEDICAL & SURGICAL HISTORY:  No pertinent past medical history    No significant past surgical history    Medications:  MEDICATIONS  (STANDING):  artificial  tears Solution 1 Drop(s) Both EYES every 12 hours  chlorhexidine 0.12% Liquid 15 milliLiter(s) Oral Mucosa every 12 hours  chlorhexidine 4% Liquid 1 Application(s) Topical <User Schedule>  dextrose 5%. 1000 milliLiter(s) (50 mL/Hr) IV Continuous <Continuous>  dextrose 50% Injectable 25 Gram(s) IV Push once  insulin lispro (ADMELOG) corrective regimen sliding scale   SubCutaneous every 4 hours  norepinephrine Infusion 1.5 MICROgram(s)/kG/Min (173 mL/Hr) IV Continuous <Continuous>  pantoprazole  Injectable 40 milliGRAM(s) IV Push two times a day  petrolatum Ophthalmic Ointment 1 Application(s) Both EYES every 12 hours  phenylephrine    Infusion 1 MICROgram(s)/kG/Min (23.1 mL/Hr) IV Continuous <Continuous>    MEDICATIONS  (PRN):  sodium chloride 0.9% lock flush 10 milliLiter(s) IV Push every 1 hour PRN Pre/post blood products, medications, blood draw, and to maintain line patency      LABS:                        12.6   11.61 )-----------( 44       ( 2021 05:00 )             37.2     11-06    144  |  107  |  28<H>  ----------------------------<  141<H>  3.6   |  30  |  1.99<H>    Ca    8.2<L>      2021 05:00  Phos  4.2     11-  Mg     2.4     11-    TPro  7.0  /  Alb  2.8<L>  /  TBili  2.6<H>  /  DBili  x   /  AST  386<H>  /  ALT  134<H>  /  AlkPhos  104  11-06      CARDIAC MARKERS ( 2021 05:00 )  x     / x     / 3857 U/L / x     / x      CARDIAC MARKERS ( 2021 23:00 )  x     / x     / 4722 U/L / x     / x      CARDIAC MARKERS ( 2021 22:55 )  x     / x     / 8801 U/L / x     / 10.1 ng/mL        PT/INR - ( 2021 05:00 )   PT: 17.3 sec;   INR: 1.46 ratio         PTT - ( 2021 05:00 )  PTT:33.2 sec  Urinalysis Basic - ( 2021 20:00 )    Color: Yellow / Appearance: Slightly Turbid / S.010 / pH: x  Gluc: x / Ketone: Negative  / Bili: Negative / Urobili: Negative   Blood: x / Protein: 500 mg/dL / Nitrite: Negative   Leuk Esterase: Negative / RBC: 26-50 /HPF / WBC 3-5 /HPF   Sq Epi: x / Non Sq Epi: Neg.-Few / Bacteria: Moderate /HPF        Serum Pro-Brain Natriuretic Peptide: 3119 pg/mL (21 @ 21:02)        CULTURES: (if applicable)    Culture - Blood (collected 21 @ 21:01)  Source: .Blood Blood  Gram Stain (21 @ 23:16):    Growth in aerobic and anaerobic bottles: Gram Variable Rods  Preliminary Report (21 @ 00:49):    Growth in aerobic and anaerobic bottles: Gram Variable Rods    **BCID performed. No targets detected.**    ***Blood Panel PCR results on this specimen are available    approximately 3 hours after the Gram stain result.***    Gram stain, PCR, and/or cultureresults may not always    correspond due to difference in methodologies.    ************************************************************    This PCR assay was performed by multiplex PCR. This    Assay tests for 66 bacterial and resistance gene targets.    Please refer to the [facility] test directory    at [email or web address] for details.    Culture - Blood (collected 21 @ 21:01)  Source: .Blood Blood  Gram Stain (21 @ 23:15):    Growth in aerobic and anaerobic bottles: Gram Variable Rods  Preliminary Report (21 @ 23:16):    Growth in aerobic and anaerobic bottles: Gram Variable Rods    ABG - ( 2021 08:17 )  pH, Arterial: 7.25  pH, Blood: x     /  pCO2: 64    /  pO2: 74    / HCO3: 28    / Base Excess: 0.9   /  SaO2: 94.9      VITALS:  T(C): 34.6 (21 @ 08:15), Max: 34.6 (21 @ 08:15)  T(F): 94.2 (21 @ 08:15), Max: 94.2 (21 @ 08:15)  HR: 131 (21 08:30) (68 - 131)  BP: 87/51 (21 @ 08:30) (83/44 - 109/77)  BP(mean): 61 (21 @ 08:30) (56 - 88)  ABP: 88/45 (21 @ 08:30) (85/44 - 133/80)  ABP(mean): 56 (21 @ 08:30) (56 - 101)  RR: 20 (21 08:30) (20 - 20)  SpO2: 91% (21 @ 08:30) (88% - 98%)  CVP(mm Hg): --  CVP(cm H2O): --    Ins and Outs     21 @ 07:01  -  21 @ 07:00  --------------------------------------------------------  IN: 2048.7 mL / OUT: 3140 mL / NET: -1091.3 mL        Height (cm): 185.4 (21 @ 18:50)  Weight (kg): 123 (21 @ 19:09)  BMI (kg/m2): 35.8 (21 @ 19:09)    Device: Avea, Mode: AC/ CMV (Assist Control/ Continuous Mandatory Ventilation), RR (machine): 20, RR (patient): 20, TV (machine): 450, TV (patient): 410, FiO2: 50, PEEP: 5, MAP: 11, PIP: 27    I&O's Detail    2021 07:01  -  2021 07:00  --------------------------------------------------------  IN:    dextrose 5% + sodium chloride 0.45%: 900 mL    Insulin: 9.2 mL    IV PiggyBack: 800 mL    Norepinephrine: 41.1 mL    Propofol: 48.4 mL    Sodium Bicarbonate: 250 mL  Total IN: 2048.7 mL    OUT:    Voided (mL): 3140 mL  Total OUT: 3140 mL    Total NET: -1091.3 mL

## 2021-11-06 NOTE — PROGRESS NOTE ADULT - SUBJECTIVE AND OBJECTIVE BOX
AYALA PERDUE  [id number]      Chief Complaint: Cardiac arrest/History of alcohol and drug abuse    Interval History: The patient is intubated and sedated.    Tele: sinus tachycardia 130s BPM    Current meds:   artificial  tears Solution 1 Drop(s) Both EYES every 12 hours  chlorhexidine 0.12% Liquid 15 milliLiter(s) Oral Mucosa every 12 hours  chlorhexidine 4% Liquid 1 Application(s) Topical <User Schedule>  dextrose 5%. 1000 milliLiter(s) IV Continuous <Continuous>  dextrose 50% Injectable 25 Gram(s) IV Push once  insulin lispro (ADMELOG) corrective regimen sliding scale   SubCutaneous every 4 hours  norepinephrine Infusion 1.5 MICROgram(s)/kG/Min IV Continuous <Continuous>  pantoprazole  Injectable 40 milliGRAM(s) IV Push two times a day  petrolatum Ophthalmic Ointment 1 Application(s) Both EYES every 12 hours  phenylephrine    Infusion 1 MICROgram(s)/kG/Min IV Continuous <Continuous>  sodium chloride 0.9% lock flush 10 milliLiter(s) IV Push every 1 hour PRN      Objective:     Vital Signs:   T(C): 35.8 (11-06-21 @ 09:00), Max: 35.8 (11-06-21 @ 09:00)  HR: 141 (11-06-21 @ 09:07) (68 - 141)  BP: 90/52 (11-06-21 @ 09:07) (83/44 - 109/77)  RR: 24 (11-06-21 @ 09:07) (20 - 24)  SpO2: 90% (11-06-21 @ 09:07) (88% - 98%)  Wt(kg): --    Physical Exam:   General: young man, ill appearing, intubated, sedated  CVS: JVP difficult to assess 2/2 intubation collar, tachycardic, s1, s2  Pulm: coarse anterior breath sounds  Ext: mottled lower extremities, no edema   Neuro: intubated, sedated      Labs:   06 Nov 2021 05:00    144    |  107    |  28     ----------------------------<  141    3.6     |  30     |  1.99     Ca    8.2        06 Nov 2021 05:00  Phos  4.2       05 Nov 2021 05:00  Mg     2.4       05 Nov 2021 05:00    TPro  7.0    /  Alb  2.8    /  TBili  2.6    /  DBili  x      /  AST  386    /  ALT  134    /  AlkPhos  104    06 Nov 2021 05:00                          12.6   11.61 )-----------( 44       ( 06 Nov 2021 05:00 )             37.2     PT/INR - ( 06 Nov 2021 05:00 )   PT: 17.3 sec;   INR: 1.46 ratio         PTT - ( 06 Nov 2021 05:00 )  PTT:33.2 sec  CARDIAC MARKERS ( 06 Nov 2021 05:00 )  x     / x     / 3857 U/L / x     / x      CARDIAC MARKERS ( 05 Nov 2021 23:00 )  x     / x     / 4722 U/L / x     / x      CARDIAC MARKERS ( 04 Nov 2021 22:55 )  x     / x     / 8801 U/L / x     / 10.1 ng/mL          ECG        AYALA PERDUE  [id number]      Chief Complaint: Cardiac arrest/History of alcohol and drug abuse    Interval History: The patient is intubated and sedated.    Tele: sinus tachycardia 130s BPM    Current meds:   artificial  tears Solution 1 Drop(s) Both EYES every 12 hours  chlorhexidine 0.12% Liquid 15 milliLiter(s) Oral Mucosa every 12 hours  chlorhexidine 4% Liquid 1 Application(s) Topical <User Schedule>  dextrose 5%. 1000 milliLiter(s) IV Continuous <Continuous>  dextrose 50% Injectable 25 Gram(s) IV Push once  insulin lispro (ADMELOG) corrective regimen sliding scale   SubCutaneous every 4 hours  norepinephrine Infusion 1.5 MICROgram(s)/kG/Min IV Continuous <Continuous>  pantoprazole  Injectable 40 milliGRAM(s) IV Push two times a day  petrolatum Ophthalmic Ointment 1 Application(s) Both EYES every 12 hours  phenylephrine    Infusion 1 MICROgram(s)/kG/Min IV Continuous <Continuous>  sodium chloride 0.9% lock flush 10 milliLiter(s) IV Push every 1 hour PRN      Objective:     Vital Signs:   T(C): 35.8 (11-06-21 @ 09:00), Max: 35.8 (11-06-21 @ 09:00)  HR: 141 (11-06-21 @ 09:07) (68 - 141)  BP: 90/52 (11-06-21 @ 09:07) (83/44 - 109/77)  RR: 24 (11-06-21 @ 09:07) (20 - 24)  SpO2: 90% (11-06-21 @ 09:07) (88% - 98%)  Wt(kg): --    Physical Exam:   General: young man, ill appearing, intubated, sedated  CVS: JVP difficult to assess 2/2 intubation collar, tachycardic, s1, s2  Pulm: coarse anterior breath sounds  Ext: mottled lower extremities, no edema   Neuro: intubated, sedated      Labs:   06 Nov 2021 05:00    144    |  107    |  28     ----------------------------<  141    3.6     |  30     |  1.99     Ca    8.2        06 Nov 2021 05:00  Phos  4.2       05 Nov 2021 05:00  Mg     2.4       05 Nov 2021 05:00    TPro  7.0    /  Alb  2.8    /  TBili  2.6    /  DBili  x      /  AST  386    /  ALT  134    /  AlkPhos  104    06 Nov 2021 05:00                          12.6   11.61 )-----------( 44       ( 06 Nov 2021 05:00 )             37.2     PT/INR - ( 06 Nov 2021 05:00 )   PT: 17.3 sec;   INR: 1.46 ratio         PTT - ( 06 Nov 2021 05:00 )  PTT:33.2 sec  CARDIAC MARKERS ( 06 Nov 2021 05:00 )  x     / x     / 3857 U/L / x     / x      CARDIAC MARKERS ( 05 Nov 2021 23:00 )  x     / x     / 4722 U/L / x     / x      CARDIAC MARKERS ( 04 Nov 2021 22:55 )  x     / x     / 8801 U/L / x     / 10.1 ng/mL      No prior cardiac workup available       ECG (11/4/21): sinus tachycardia, lateral ST depressions (no prior ECGs available)  ECG (11/5/21): sinus rhythm, IVCD    TTE (11/5/21):   1. Left ventricular ejection fraction, by visual estimation, is 45 to 50%.   2. Mildly decreased global left ventricular systolic function.   3. Increased LV wall thickness.   4. Normal left ventricular internal cavity size.   5. There is mild concentric left ventricular hypertrophy.   6. Trace mitral valve regurgitation.   7. Mild tricuspid regurgitation.    CT Head (11/4/21):  BRAIN:  Diffuse gray-white matter indistinctness strongly suspicious for cerebral edema possibly secondary to hypoxic ischemic encephalopathy. Clinical correlation will determine the need for brain MR for further evaluation.    No intracranial hemorrhage.         AYALA PERDUE  [id number]      Chief Complaint: History of alcohol abuse/Cardiac arrest/Respiratory failure/Acute renal injury/Severe thrombocytopenia    Interval History: The patient is intubated and sedated.    Tele: sinus tachycardia 130s BPM    Current meds:   artificial  tears Solution 1 Drop(s) Both EYES every 12 hours  chlorhexidine 0.12% Liquid 15 milliLiter(s) Oral Mucosa every 12 hours  chlorhexidine 4% Liquid 1 Application(s) Topical <User Schedule>  dextrose 5%. 1000 milliLiter(s) IV Continuous <Continuous>  dextrose 50% Injectable 25 Gram(s) IV Push once  insulin lispro (ADMELOG) corrective regimen sliding scale   SubCutaneous every 4 hours  norepinephrine Infusion 1.5 MICROgram(s)/kG/Min IV Continuous <Continuous>  pantoprazole  Injectable 40 milliGRAM(s) IV Push two times a day  petrolatum Ophthalmic Ointment 1 Application(s) Both EYES every 12 hours  phenylephrine    Infusion 1 MICROgram(s)/kG/Min IV Continuous <Continuous>  sodium chloride 0.9% lock flush 10 milliLiter(s) IV Push every 1 hour PRN      Objective:     Vital Signs:   T(C): 35.8 (11-06-21 @ 09:00), Max: 35.8 (11-06-21 @ 09:00)  HR: 141 (11-06-21 @ 09:07) (68 - 141)  BP: 90/52 (11-06-21 @ 09:07) (83/44 - 109/77)  RR: 24 (11-06-21 @ 09:07) (20 - 24)  SpO2: 90% (11-06-21 @ 09:07) (88% - 98%)  Wt(kg): --    Physical Exam:   General: young man, intubated, sedated  CVS: JVP difficult to assess 2/2 intubation collar, tachycardic, s1, s2  Pulm: coarse anterior breath sounds  Ext: mottled lower extremities, no edema   Neuro: intubated, sedated      Labs:   06 Nov 2021 05:00    144    |  107    |  28     ----------------------------<  141    3.6     |  30     |  1.99     Ca    8.2        06 Nov 2021 05:00  Phos  4.2       05 Nov 2021 05:00  Mg     2.4       05 Nov 2021 05:00    TPro  7.0    /  Alb  2.8    /  TBili  2.6    /  DBili  x      /  AST  386    /  ALT  134    /  AlkPhos  104    06 Nov 2021 05:00                          12.6   11.61 )-----------( 44       ( 06 Nov 2021 05:00 )             37.2     PT/INR - ( 06 Nov 2021 05:00 )   PT: 17.3 sec;   INR: 1.46 ratio         PTT - ( 06 Nov 2021 05:00 )  PTT:33.2 sec  CARDIAC MARKERS ( 06 Nov 2021 05:00 )  x     / x     / 3857 U/L / x     / x      CARDIAC MARKERS ( 05 Nov 2021 23:00 )  x     / x     / 4722 U/L / x     / x      CARDIAC MARKERS ( 04 Nov 2021 22:55 )  x     / x     / 8801 U/L / x     / 10.1 ng/mL      No prior cardiac workup available       ECG (11/4/21): sinus tachycardia, lateral ST depressions (no prior ECGs available)  ECG (11/5/21): sinus rhythm, IVCD    TTE (11/5/21):   1. Left ventricular ejection fraction, by visual estimation, is 45 to 50%.   2. Mildly decreased global left ventricular systolic function.   3. Increased LV wall thickness.   4. Normal left ventricular internal cavity size.   5. There is mild concentric left ventricular hypertrophy.   6. Trace mitral valve regurgitation.   7. Mild tricuspid regurgitation.    CT Head (11/4/21):  BRAIN:  Diffuse gray-white matter indistinctness strongly suspicious for cerebral edema possibly secondary to hypoxic ischemic encephalopathy. Clinical correlation will determine the need for brain MR for further evaluation.    No intracranial hemorrhage.

## 2021-11-06 NOTE — PROGRESS NOTE ADULT - ASSESSMENT
Assessment:  Nicola Dodd is a 41 year old man with past medical history of GERD, Alcohol abuse and drug abuse who was found unresponsive at home, s/p CPR with ROSC in field s/p intubation, currently admitted in ICU with cardiac arrest, acute renal injury, severe acidosis and severe thrombocytopenia, overall with multiorgan dysfunction, with concern for anoxic brain injury, currently on pressor support and s/p hypothermia protocol.   Assessment:  Nicola Dodd is a 41 year old man with past medical history of Alcohol abuse was found unresponsive at home, s/p CPR with ROSC in field s/p intubation, currently admitted in ICU with cardiac arrest, acute respiratory failure, acute renal injury, severe acidosis and severe thrombocytopenia, overall with multiorgan dysfunction, with concern for anoxic brain injury, currently on pressor support and s/p hypothermia protocol.    Unclear etiology of cardiac arrest at this time, concerns for anoxic brain injury on arrival. Troponin elevation may be due to ischemic event vs CPR vs secondary to anoxia/severe acidosis/acute renal injury. Echo with mildly reduced LVEF. Telemetry consistent with sinus tachycardia. Urine toxicology negative, concern for patient taking unknown "pill" prior to cardiac arrest per chart and primary team.    Recommendations:  [] Cardiac arrest: Supportive care per ICU team, does not appear to be candidate for invasive coronary angiogram at this time as patient is critically ill and unstable on pressor support with respiratory failure, severe thrombocytopenia and acute renal dysfunction. Further cardiac workup pending neurologic recovery. Agree with pressor support for goal MAP per Neuro. Follow up Neuro  [] Prognosis appears guarded    We will continue to follow along. Discussed with Dr. Gonzalo Miranda MD  Cardiology

## 2021-11-06 NOTE — GOALS OF CARE CONVERSATION - ADVANCED CARE PLANNING - CONVERSATION DETAILS
Family at bedside, requesting all pressors be shut of.  Four daughters at bedside in agreement and aware of immanent mortality without vasopressor support.
Spoke to patient's sister Ale Dodd about patients decompensation this morning becoming more hypotensive and tachycardic, second vasopressor added to maintain blood pressure.    At this time sister requesting continuation of care and is trying to gather the family to come to bedside but does not want a third pressor added to sustain blood pressure.    Grim prognosis of today's events discussed, sister in understanding of patient's imminent mortality.
Approached by patient's sister Ale Dodd while she was visiting with patient for updates on clinical status.  Discussed patients poor neurological status and unlikelihood of recovery to what sister considers meaningful status.    At this time sister requests patient be DNR if additional cardiac arrest occurs, but sill requesting other treatments including vasopressor and ventilator support.  Her plan with family is to assess after cooling protocol ends tomorrow and if clinical status remains unchanged withdrawal of care.

## 2021-11-06 NOTE — DISCHARGE NOTE FOR THE EXPIRED PATIENT - NS PATIENT CONDITION EXPIRED SS
Full DNR noted with asystolic tracing on monitor and flat arterial line.  On exam no heart sounds to prolonged auscultation, ventilator held and no spontaneous respirations noted./

## 2021-11-06 NOTE — DISCHARGE NOTE FOR THE EXPIRED PATIENT - HOSPITAL COURSE
41 year old male with PMH of ETOH and drug abuse presented to [facility] ED 11/4 status post cardiac arrest. Girlfriend reported patient was lethargic and not feeling well on on day of admission, took unidentified pills and went to the bathroom, after about 10 minutes daughter heard loud crash in bathroom.  Girlfriend and daughter broke into bathroom and reportedly found patient on toilet shaking, subsequently fell off the toilet striking his head on floor and bathroom.  EMS activated, and pt had no pulse, in field, pt was intubated, and given 4 rounds of epi, calcium and bicarb, and ROSC obtained.    pt admitted to ICU , ECG reviewd, pt on full vent support, pressor support . CT scan done with acute finding. Pt started on Hypothermic protocol in ER at 2017.   pt admitted to ICU 2250. Received a call from patient's sister Ale Dodd, [phone]), who is a dialysis nurse - states patient was sick yesterday all day, "just not feeling well". He supposedly took some unidentified pills from his coworker earlier yesterday. Then in the afternoon he went to the bathroom. The gf heard a crash in the bathroom, and by the time they were able to break into the bathroom, they found him seizing in the bathtub. EMS came about 10 - 15 min later and took about 25 min to achieve ROSC. So realistically she feels patient was with out a pulse for about 50 minutes.  41 year old male with PMH of ETOH and drug abuse presented to [facility] ED 11/4 status post cardiac arrest. Girlfriend reported patient was lethargic and not feeling well on on day of admission, took unidentified pills and went to the bathroom, after about 10 minutes daughter heard loud crash in bathroom.  Girlfriend and daughter broke into bathroom and reportedly found patient seizing in the bathtub.  EMS activated, and on arrival about 15 minutes later patient found pulseless.  Intubated and ROSC achieved after about 25 minutes of ACLS.  Brought to [facility], had 2 subsequent arrests with ROSC after ACLS in the ED.    Patient with no neurological response after ROSC, started on hypothermia protocol with sedation.  CT head significant for loss of grey/white matter differentiation.  Labs revealed lactic acidosis and leukocytosis.  Started on Levophed and transferred to ICU for further evaluation and management.    On arrival to ICU patient noted to have no gag reflex, no response to painful stimuli no corneal reflex and was not triggering ventilator on minimal sedation.  Troponins elevated, echocardiogram significant for systolic dysfunction.  Patient also hyperglycemic, started on insulin infusion and required potassium supplementation.  Sister at bedside requested continuation of support but DNR if patient were to arrest again.    This morning patient began rewarming phase of hypothermia protocol and sedation held.  Patient with again no gag, no corneal reflex, no response to painful stimuli.  Was noted to be triggering ventilator on CPAP mode but with low tidal volumes and respiratory rate less than 6.  Noted to have increasing pressor requirements, acute respiratory acidosis and tachycardia.  Ventilator adjusted, pressor changed to norsynephrine which was rapidly increased to maximum dosage, levophed restarted and also titrated to maximum dosage.    Four sisters at bedside, further family joined by phone.  Diagnosis and prognosis discussed at length with family, family opted to hold vasopressor support with expectation of patient's imminent mortality.  Pressors held about 13:30, time of death noted at 1354.      Case presented to Medical Examiner and accepted.  This is only a summary of events during admission, please refer to full medical record for details of hospital stay.

## 2023-08-17 NOTE — PATIENT PROFILE ADULT - NUMBER OF YRS
20 Consent 1/Introductory Paragraph: The rationale for Mohs was explained to the patient and consent was obtained. The risks, benefits and alternatives to therapy were discussed in detail. Specifically, the risks of infection, scarring, bleeding, prolonged wound healing, incomplete removal, allergy to anesthesia, nerve injury and recurrence were addressed. Prior to the procedure, the treatment site was clearly identified and confirmed by the patient and the physician together. All components of Universal Protocol/PAUSE Rule completed.

## 2025-01-21 NOTE — ED ADULT NURSE NOTE - OBJECTIVE STATEMENT
What Is The Reason For Today's Visit?: History of Basal Cell Carcinoma
How Many Bccs Have You Had?: more than one
P tis alert, came to the ER via EMS due to bleeding varicose vein after he scratched his left leg. Denies any pain or dizziness, fever or Covid exposure. Pt admitted to having some drinks today.